# Patient Record
Sex: FEMALE | Race: BLACK OR AFRICAN AMERICAN | Employment: FULL TIME | URBAN - METROPOLITAN AREA
[De-identification: names, ages, dates, MRNs, and addresses within clinical notes are randomized per-mention and may not be internally consistent; named-entity substitution may affect disease eponyms.]

---

## 2020-04-12 ENCOUNTER — ED HISTORICAL/CONVERTED ENCOUNTER (OUTPATIENT)
Dept: OTHER | Age: 20
End: 2020-04-12

## 2020-04-17 ENCOUNTER — ED HISTORICAL/CONVERTED ENCOUNTER (OUTPATIENT)
Dept: OTHER | Age: 20
End: 2020-04-17

## 2020-04-19 ENCOUNTER — ED HISTORICAL/CONVERTED ENCOUNTER (OUTPATIENT)
Dept: OTHER | Age: 20
End: 2020-04-19

## 2020-04-21 ENCOUNTER — ED HISTORICAL/CONVERTED ENCOUNTER (OUTPATIENT)
Dept: OTHER | Age: 20
End: 2020-04-21

## 2020-06-27 ENCOUNTER — ED HISTORICAL/CONVERTED ENCOUNTER (OUTPATIENT)
Dept: OTHER | Age: 20
End: 2020-06-27

## 2022-07-26 ENCOUNTER — APPOINTMENT (OUTPATIENT)
Dept: GENERAL RADIOLOGY | Age: 22
End: 2022-07-26
Attending: EMERGENCY MEDICINE
Payer: COMMERCIAL

## 2022-07-26 ENCOUNTER — HOSPITAL ENCOUNTER (EMERGENCY)
Age: 22
Discharge: HOME | End: 2022-07-26
Attending: EMERGENCY MEDICINE
Payer: COMMERCIAL

## 2022-07-26 VITALS
OXYGEN SATURATION: 98 % | RESPIRATION RATE: 17 BRPM | WEIGHT: 187 LBS | BODY MASS INDEX: 28.34 KG/M2 | TEMPERATURE: 98.1 F | HEIGHT: 68 IN | HEART RATE: 74 BPM | DIASTOLIC BLOOD PRESSURE: 84 MMHG | SYSTOLIC BLOOD PRESSURE: 120 MMHG

## 2022-07-26 DIAGNOSIS — M25.531 RIGHT WRIST PAIN: Primary | ICD-10-CM

## 2022-07-26 PROCEDURE — 73110 X-RAY EXAM OF WRIST: CPT

## 2022-07-26 PROCEDURE — 74011250637 HC RX REV CODE- 250/637: Performed by: NURSE PRACTITIONER

## 2022-07-26 PROCEDURE — 99283 EMERGENCY DEPT VISIT LOW MDM: CPT

## 2022-07-26 RX ORDER — NAPROXEN 500 MG/1
500 TABLET ORAL
Status: COMPLETED | OUTPATIENT
Start: 2022-07-26 | End: 2022-07-26

## 2022-07-26 RX ORDER — NAPROXEN 500 MG/1
500 TABLET ORAL 2 TIMES DAILY WITH MEALS
Qty: 20 TABLET | Refills: 0 | Status: SHIPPED | OUTPATIENT
Start: 2022-07-26 | End: 2022-08-05

## 2022-07-26 RX ADMIN — NAPROXEN 500 MG: 500 TABLET ORAL at 13:56
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
## 2022-07-26 NOTE — Clinical Note
[facility] EMERGENCY DEPT  [address] [place] [address] [place] [address]  [phone]    Work/School Note    Date: 7/26/2022    To Whom It May concern:    Bora Kilgore was seen and treated today in the emergency room by the following provider(s):  Attending Provider: Juni Griffith MD  Nurse Practitioner: Mendel Verdugo NP. Bora Kilgore is excused from work/school on 07/26/22 and 07/27/22. She is medically clear to return to work/school on 7/28/2022.        Sincerely,          Margareth Chua NP
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
## 2022-08-07 ENCOUNTER — HOSPITAL ENCOUNTER (EMERGENCY)
Age: 22
Discharge: LEFT BEFORE DISCHARGE | End: 2022-08-07
Attending: EMERGENCY MEDICINE
Payer: COMMERCIAL

## 2022-08-07 VITALS
WEIGHT: 179 LBS | HEART RATE: 69 BPM | OXYGEN SATURATION: 99 % | BODY MASS INDEX: 27.13 KG/M2 | HEIGHT: 68 IN | RESPIRATION RATE: 16 BRPM | SYSTOLIC BLOOD PRESSURE: 116 MMHG | DIASTOLIC BLOOD PRESSURE: 73 MMHG | TEMPERATURE: 98.4 F

## 2022-08-07 PROCEDURE — 75810000275 HC EMERGENCY DEPT VISIT NO LEVEL OF CARE

## 2022-09-15 ENCOUNTER — HOSPITAL ENCOUNTER (EMERGENCY)
Age: 22
Discharge: HOME | End: 2022-09-15
Attending: STUDENT IN AN ORGANIZED HEALTH CARE EDUCATION/TRAINING PROGRAM | Admitting: STUDENT IN AN ORGANIZED HEALTH CARE EDUCATION/TRAINING PROGRAM
Payer: COMMERCIAL

## 2022-09-15 VITALS
DIASTOLIC BLOOD PRESSURE: 95 MMHG | BODY MASS INDEX: 28.34 KG/M2 | SYSTOLIC BLOOD PRESSURE: 130 MMHG | OXYGEN SATURATION: 99 % | TEMPERATURE: 98.3 F | HEIGHT: 68 IN | HEART RATE: 72 BPM | WEIGHT: 187 LBS | RESPIRATION RATE: 18 BRPM
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
## 2022-09-15 DIAGNOSIS — M25.561 ACUTE PAIN OF RIGHT KNEE: ICD-10-CM

## 2022-09-15 PROCEDURE — 99283 EMERGENCY DEPT VISIT LOW MDM: CPT

## 2022-09-15 RX ORDER — ACETAMINOPHEN 325 MG/1
650 TABLET ORAL
Qty: 20 TABLET | Refills: 0 | Status: SHIPPED | OUTPATIENT
Start: 2022-09-15

## 2022-09-15 RX ORDER — METHOCARBAMOL 750 MG/1
750 TABLET, FILM COATED ORAL
Qty: 20 TABLET | Refills: 0 | Status: SHIPPED | OUTPATIENT
Start: 2022-09-15

## 2022-09-15 NOTE — DISCHARGE INSTRUCTIONS
Thank you for presenting to our Emergency Department for your care. Below you will find a list of your labs and imaging from your visit today if applicable. Should you have any questions regarding these results please do not hesitate to call the emergency department. Please review [employer] for a more detailed result list since the below list may not be comprehensive. Instructions on how to sign up to [employer] should be provided in this packet. Labs -   No results found for this or any previous visit (from the past 12 hour(s)). Radiologic Studies -   No orders to display     CT Results  (Last 48 hours)      None          CXR Results  (Last 48 hours)      None          ____________________________________________________________________________    You decided to leave the Emergency Department Against Medical Advice. I have voiced my concerns that a full evaluation and/or treatment had not occurred. You still need continued evaluation to determine if your symptoms are caused by a condition that present risk of death,r morbidity, permanent disability, prolonged hospitalization, prolonged illness, among others. For this reason, we urge you to return or go to any emergency department to continue your care. Leaving against medical advice from our facility does not preclude you from coming back to continue your care, in fact, we encourage you to. Call 911 if you experience any worsening or new symptoms after leaving our Emergency Department. Please seek evaluation promptly. The exam and treatment you received in the Emergency Department were for an urgent problem and are not intended as complete care. It is important that you follow-up with a doctor, nurse practitioner, or physician assistant to:  (1) confirm your diagnosis,  (2) re-evaluation of changes in your illness and treatment, and  (3) for ongoing care.   If your symptoms become worse or you do not improve as expected and you are unable to reach your usual health care provider, you should return to the Emergency Department. We are available 24 hours a day. If you change your mind and decide to come back to the Emergency Department to continue your care you are more than welcome to come back here. We encourage that. Please take your discharge instructions with you when you go to your follow-up appointment or if you go to another Emergency Department to continue your care. If a prescription has been provided, please have it filled as soon as possible to prevent a delay in treatment. Read the entire medication instruction sheet provided to you by the pharmacy. If you have any questions or reservations about taking the medication due to side effects or interactions with other medications, please call your primary care physician or contact the ER to speak with the charge nurse.

## 2022-09-15 NOTE — ED PROVIDER NOTES
[facility]  EMERGENCY DEPARTMENT ENCOUNTER NOTE    Date: 9/15/2022  Patient Name: Yudy Motley    History of Presenting Illness     Chief Complaint   Patient presents with    Fall     HPI: Yudy Motley, 24 y.o. female with no known past medical history or medications presents for knee pain. - Onset prior to presentation. She had a mechanical fall hitting her knee directly. She has tenderness over the medial aspect of the knee. She has an antalgic gait however the patient denies any numbness or weakness in the foot. No hip pain. She did not have any head trauma, loss consciousness. Denies any alcohol or drugs. On arrival to the room, the patient was on the phone, crying, and saying that she wants to leave the emergency department. She does not want to go for x-ray and does not want any testing. She said that she is very adamant about leaving now and does not want to wait even for 20 minutes to get the x-ray with the results back. Status importance of getting an x-ray as I am unable to rule out a fracture but the patient is insistent on leaving. I did explain to her that a knee fracture may cause complications if undiagnosed and might cause early arthritis and many other complications. She understands but reports that she will come back to the emergency department when she feels better. She was asked if there is anything else going on with her but she denies any other issues and still requests to leave.   Does not appear to be intoxicated and has capacity to make her own decisions.    - Gait: antalgic, able to bear weight.  - History of fractures or surgeries: no  - AC use: no  - Overlying lacerations: no  - Other injuries: no    Medical History   I reviewed the medical, surgical, family, and social history, as well as allergies:    PCP: Yaquelin Finch MD    Past Medical History:  Past Medical History:   Diagnosis Date    Right wrist injury      Past Surgical History:  No past surgical history on file. Current Outpatient Medications:  Current Outpatient Medications   Medication Instructions    acetaminophen (TYLENOL) 650 mg, Oral, EVERY 6 HOURS AS NEEDED    methocarbamoL (ROBAXIN-750) 750 mg, Oral, 3 TIMES DAILY AS NEEDED      Family History:  No family history on file. Social History:  Social History     Tobacco Use    Smoking status: Never    Smokeless tobacco: Never   Substance Use Topics    Alcohol use: Never    Drug use: Never     Allergies:  No Known Allergies    Review of Systems     Positives and pertinent negatives as per HPI. All other systems were reviewed and are negative. Physical Exam and Vital Signs   Vital Signs - Reviewed the patient's vital signs. Patient Vitals for the past 12 hrs:   Temp Pulse Resp BP SpO2   09/15/22 1828 98.3 °F (36.8 °C) 74 20 (!) 139/107 99 %     Physical Exam:    GENERAL: not in apparent distress  HEENT:  * EOMI  * Head atraumatic  CV:  * warm extremities  * no cyanosis  PULMONARY: no respiratory distress, non labored breathing, no audible wheezing or stridor, no accessory muscle use  ABDOMEN: soft, moving in bed and pulls to seated position without grimace or pain  JOINT: R Knee  * No swelling, no erythema. * No abrasions, no lacerations  * No warmth to palpation  * Noted medial tenderness to palpation  * No passive ROM limitation  * No active ROM limitation  * No instability or laxity on varus or valgus stress  * No abmormlaities on neurovascular exam including DP, PT pulses and sensation over lower extremity dermatomes. * Gait is antalgic, patient able to bear weight  SKIN: no rashes or signs of trauma  NEURO:  * Speech clear  * GCS 15    Medical Decision Making   - I am the first and primary provider for this patient and am the primary provider of record. - I reviewed the vital signs, available nursing notes, past medical history, past surgical history, family history and social history.   - Initial assessment performed. The patients presenting problems have been discussed, and the staff are in agreement with the care plan formulated and outlined with them. I have encouraged them to ask questions as they arise throughout their visit. - Available medical records, nursing notes, old EKGs, and EMS run sheets (if patient was EMS transported) were reviewed    MDM:   Patient is a 24 y.o. female presenting for knee pain after mechanical fall. Vitals reveal no significant abnormalities and physical exam reveals  medial knee tendernes  with no gross knee instability or neurovascular compromise. Based on the history, physical exam, risk factors, and vital signs, differential includes: sprain, strain, ligamentous injury, fracture, dislocation, soft tissue contusion. Nerve and vascular damage is unlikely as the patient has a completely benign neurovascular exam.  Although presentation is consistent with a sprain, I cannot rule out fracture. X-ray was offered, the patient is refusing. Results     Labs:  No results found for this or any previous visit (from the past 12 hour(s)). Radiologic Studies:  CT Results  (Last 48 hours)      None          CXR Results  (Last 48 hours)      None          Medications ordered:  Medications - No data to display  ED Course and Reassessment     ED Course:          Reassessment:    The patient has chosen to leave the hospital against our medical advice due to not wanting to wait. The patient is clinically not intoxicated, appears to have intact insight, judgment and reason and in my medical opinion has the capacity to make decisions. It would be battery to subject a patient to treatment against their will. I have voiced my concerns for the patient's health given that a full evaluation and/or treatment had not occurred. I have discussed the need for continued evaluation to determine if their symptoms are caused by a condition that present risk of morbidity.  Risks including but not limited to permanent disability, prolonged hospitalization, prolonged illness, were discussed. I tried offering alternative options in hopes that the patient might be amenable to partial evaluation and treatment which would be medically beneficial to the patient, though the patient declined my options and insisted on leaving. Because I have been unable to convince the patient to stay, I answered any questions they had about their condition and asked them to return to this or any other ED as soon as possible to complete their evaluation. I emphasized that leaving against medical advice does not preclude returning here for further evaluation. I urged the patient to return if they change their mind about the further evaluation and treatment. Final Disposition     DISCHARGED FROM EMERGENCY DEPARTMENT    Patient will be discharged from the Emergency Department in stable condition. All of the diagnostic tests were reviewed and any questions were answered. Diagnosis, results, follow up if applicable, and return precautions were discussed. I have also put together printed discharge instructions for them that include: 1) educational information regarding their diagnosis, 2) how to care for their diagnosis at home, as well a 3) list of reasons why they would want to return to the ED prior to their follow-up appointment, should their condition change. Any labs or imaging done in the ED will be either printed with the discharge paperwork or available through [address] DISCHARGE PLAN:  1. Current Discharge Medication List        START taking these medications    Details   acetaminophen (TYLENOL) 325 mg tablet Take 2 Tablets by mouth every six (6) hours as needed for Pain. Qty: 20 Tablet, Refills: 0      methocarbamoL (Robaxin-750) 750 mg tablet Take 1 Tablet by mouth three (3) times daily as needed for Muscle Spasm(s). Qty: 20 Tablet, Refills: 0           2.    Follow-up Information       Follow up With Specialties Details Why Contact Info    [facility] EMERGENCY DEPT Emergency Medicine Go today for XR of knee [address]  [phone]          3. Return to ED if worse  4. Current Discharge Medication List        START taking these medications    Details   acetaminophen (TYLENOL) 325 mg tablet Take 2 Tablets by mouth every six (6) hours as needed for Pain. Qty: 20 Tablet, Refills: 0  Start date: 9/15/2022      methocarbamoL (Robaxin-750) 750 mg tablet Take 1 Tablet by mouth three (3) times daily as needed for Muscle Spasm(s). Qty: 20 Tablet, Refills: 0  Start date: 9/15/2022             Diagnosis     Clinical Impression:   1. Fall, initial encounter    2. Acute pain of right knee      Attestations:    Janiya Richardson MD    Please note that this dictation was completed with [employer], the computer voice recognition software. Quite often unanticipated grammatical, syntax, homophones, and other interpretive errors are inadvertently transcribed by the computer software. Please disregard these errors. Please excuse any errors that have escaped final proofreading. Thank you.

## 2023-03-28 ENCOUNTER — HOSPITAL ENCOUNTER (EMERGENCY)
Age: 23
Discharge: HOME | End: 2023-03-28
Attending: EMERGENCY MEDICINE
Payer: COMMERCIAL

## 2023-03-28 ENCOUNTER — APPOINTMENT (OUTPATIENT)
Dept: ULTRASOUND IMAGING | Age: 23
End: 2023-03-28
Attending: EMERGENCY MEDICINE
Payer: COMMERCIAL

## 2023-03-28 VITALS
OXYGEN SATURATION: 99 % | BODY MASS INDEX: 27.58 KG/M2 | DIASTOLIC BLOOD PRESSURE: 77 MMHG | TEMPERATURE: 98.2 F | HEART RATE: 70 BPM | RESPIRATION RATE: 18 BRPM | WEIGHT: 182 LBS | HEIGHT: 68 IN | SYSTOLIC BLOOD PRESSURE: 122 MMHG

## 2023-03-28 DIAGNOSIS — O21.9 NAUSEA AND VOMITING IN PREGNANCY PRIOR TO 22 WEEKS GESTATION: Primary | ICD-10-CM

## 2023-03-28 LAB
ABO + RH BLD: NORMAL
ALBUMIN SERPL-MCNC: 4 G/DL (ref 3.5–5)
ALBUMIN/GLOB SERPL: 1 (ref 1.1–2.2)
ALP SERPL-CCNC: 34 U/L (ref 45–117)
ALT SERPL-CCNC: 37 U/L (ref 12–78)
ANION GAP SERPL CALC-SCNC: 7 MMOL/L (ref 5–15)
AST SERPL W P-5'-P-CCNC: 28 U/L (ref 15–37)
BASOPHILS # BLD: 0 K/UL (ref 0–0.1)
BASOPHILS NFR BLD: 1 % (ref 0–1)
BILIRUB SERPL-MCNC: 0.8 MG/DL (ref 0.2–1)
BUN SERPL-MCNC: 11 MG/DL (ref 6–20)
BUN/CREAT SERPL: 21 (ref 12–20)
CA-I BLD-MCNC: 9.6 MG/DL (ref 8.5–10.1)
CHLORIDE SERPL-SCNC: 104 MMOL/L (ref 97–108)
CO2 SERPL-SCNC: 24 MMOL/L (ref 21–32)
CREAT SERPL-MCNC: 0.52 MG/DL (ref 0.55–1.02)
DIFFERENTIAL METHOD BLD: NORMAL
EOSINOPHIL # BLD: 0.1 K/UL (ref 0–0.4)
EOSINOPHIL NFR BLD: 1 % (ref 0–7)
ERYTHROCYTE [DISTWIDTH] IN BLOOD BY AUTOMATED COUNT: 12.7 % (ref 11.5–14.5)
GLOBULIN SER CALC-MCNC: 4.2 G/DL (ref 2–4)
GLUCOSE SERPL-MCNC: 85 MG/DL (ref 65–100)
HCG SERPL-ACNC: ABNORMAL MIU/ML (ref 0–6)
HCT VFR BLD AUTO: 38.7 % (ref 35–47)
HGB BLD-MCNC: 12.5 G/DL (ref 11.5–16)
IMM GRANULOCYTES # BLD AUTO: 0 K/UL (ref 0–0.04)
IMM GRANULOCYTES NFR BLD AUTO: 0 % (ref 0–0.5)
LYMPHOCYTES # BLD: 3.3 K/UL (ref 0.8–3.5)
LYMPHOCYTES NFR BLD: 41 % (ref 12–49)
MAGNESIUM SERPL-MCNC: 2 MG/DL (ref 1.6–2.4)
MCH RBC QN AUTO: 27.7 PG (ref 26–34)
MCHC RBC AUTO-ENTMCNC: 32.3 G/DL (ref 30–36.5)
MCV RBC AUTO: 85.8 FL (ref 80–99)
MONOCYTES # BLD: 0.6 K/UL (ref 0–1)
MONOCYTES NFR BLD: 8 % (ref 5–13)
NEUTS SEG # BLD: 3.9 K/UL (ref 1.8–8)
NEUTS SEG NFR BLD: 49 % (ref 32–75)
NRBC # BLD: 0 K/UL (ref 0–0.01)
NRBC BLD-RTO: 0 PER 100 WBC
PLATELET # BLD AUTO: 232 K/UL (ref 150–400)
PMV BLD AUTO: 10.9 FL (ref 8.9–12.9)
POTASSIUM SERPL-SCNC: 4.1 MMOL/L (ref 3.5–5.1)
PROT SERPL-MCNC: 8.2 G/DL (ref 6.4–8.2)
RBC # BLD AUTO: 4.51 M/UL (ref 3.8–5.2)
SODIUM SERPL-SCNC: 135 MMOL/L (ref 136–145)
WBC # BLD AUTO: 7.9 K/UL (ref 3.6–11)

## 2023-03-28 PROCEDURE — 83735 ASSAY OF MAGNESIUM: CPT

## 2023-03-28 PROCEDURE — 99284 EMERGENCY DEPT VISIT MOD MDM: CPT

## 2023-03-28 PROCEDURE — 86900 BLOOD TYPING SEROLOGIC ABO: CPT

## 2023-03-28 PROCEDURE — 85025 COMPLETE CBC W/AUTO DIFF WBC: CPT

## 2023-03-28 PROCEDURE — 84702 CHORIONIC GONADOTROPIN TEST: CPT

## 2023-03-28 PROCEDURE — 76817 TRANSVAGINAL US OBSTETRIC: CPT

## 2023-03-28 PROCEDURE — 80053 COMPREHEN METABOLIC PANEL: CPT

## 2023-03-28 PROCEDURE — 36415 COLL VENOUS BLD VENIPUNCTURE: CPT

## 2023-03-28 RX ORDER — ONDANSETRON 4 MG/1
4 TABLET, ORALLY DISINTEGRATING ORAL
Qty: 20 TABLET | Refills: 0 | Status: SHIPPED | OUTPATIENT
Start: 2023-03-28

## 2023-03-28 RX ORDER — ONDANSETRON 2 MG/ML
4 INJECTION INTRAMUSCULAR; INTRAVENOUS ONCE
Status: DISCONTINUED | OUTPATIENT
Start: 2023-03-28 | End: 2023-03-28 | Stop reason: HOSPADM

## 2023-03-28 NOTE — ED PROVIDER NOTES
EMERGENCY DEPARTMENT HISTORY AND PHYSICAL EXAM      Date: 3/28/2023  Patient Name: Liana Swenson    History of Presenting Illness     Chief Complaint   Patient presents with    Vomiting       History Provided By: Patient    HPI: Liana Swenson, 20 y.o. female with a past medical history significant No significant past medical history presents to the ED with chief complaint of Vomiting  . 65-year-old female newly found out that she is pregnant feels that she may be very early approximately 4 to 6 weeks along. Nausea vomiting. No abdominal pain no vaginal bleeding no discharge no dysuria. No fevers. Had been prescribed prenatal vitamins as well as Reglan which have not been helping. Did not like the taste of Zofran. There are no other complaints, changes, or physical findings at this time. PCP: Gaudencio Hooker MD    Current Facility-Administered Medications   Medication Dose Route Frequency Provider Last Rate Last Admin    sodium chloride 0.9 % bolus infusion 1,000 mL  1,000 mL IntraVENous ONCE Yoly ELDRIDGE MD        ondansetron [facility] injection 4 mg  4 mg IntraVENous ONCE Marga Galdamez MD         Current Outpatient Medications   Medication Sig Dispense Refill    ondansetron (ZOFRAN ODT) 4 mg disintegrating tablet Take 1 Tablet by mouth every eight (8) hours as needed for Nausea or Vomiting. 20 Tablet 0    acetaminophen (TYLENOL) 325 mg tablet Take 2 Tablets by mouth every six (6) hours as needed for Pain. 20 Tablet 0    methocarbamoL (Robaxin-750) 750 mg tablet Take 1 Tablet by mouth three (3) times daily as needed for Muscle Spasm(s). 20 Tablet 0       Past History     Past Medical History:  Past Medical History:   Diagnosis Date    Right wrist injury        Past Surgical History:  No past surgical history on file. Family History:  No family history on file.     Social History:  Social History     Tobacco Use    Smoking status: Never    Smokeless tobacco: Never Substance Use Topics    Alcohol use: Never    Drug use: Never       Allergies:  No Known Allergies      Review of Systems   Review of Systems   Constitutional: Negative. Negative for chills, fatigue and fever. HENT: Negative. Negative for congestion, nosebleeds and sore throat. Eyes: Negative. Negative for pain, discharge and visual disturbance. Respiratory: Negative. Negative for cough, chest tightness and shortness of breath. Cardiovascular:  Negative for chest pain, palpitations and leg swelling. Gastrointestinal:  Positive for nausea. Negative for abdominal pain, blood in stool, constipation, diarrhea and vomiting. Endocrine: Negative. Genitourinary: Negative. Negative for difficulty urinating, dysuria, pelvic pain and vaginal bleeding. Musculoskeletal: Negative. Negative for arthralgias, back pain and myalgias. Skin: Negative. Negative for rash and wound. Allergic/Immunologic: Negative. Neurological: Negative. Negative for dizziness, syncope, weakness, numbness and headaches. Hematological: Negative. Psychiatric/Behavioral: Negative. Negative for agitation, confusion and suicidal ideas. All other systems reviewed and are negative. Positives and Pertinent negatives as per HPI. Physical Exam   Patient Vitals for the past 24 hrs:   Temp Pulse Resp BP SpO2   03/28/23 0944 98.2 °F (36.8 °C) 70 18 122/77 99 %         Physical Exam  Vitals and nursing note reviewed. Exam conducted with a chaperone present. Constitutional:       Appearance: Normal appearance. She is normal weight. HENT:      Head: Normocephalic and atraumatic. Nose: Nose normal.      Mouth/Throat:      Mouth: Mucous membranes are moist.      Pharynx: Oropharynx is clear. Eyes:      Extraocular Movements: Extraocular movements intact. Conjunctiva/sclera: Conjunctivae normal.      Pupils: Pupils are equal, round, and reactive to light.    Cardiovascular:      Rate and Rhythm: Normal rate and regular rhythm. Pulses: Normal pulses. Heart sounds: Normal heart sounds. Pulmonary:      Effort: Pulmonary effort is normal. No respiratory distress. Breath sounds: Normal breath sounds. Abdominal:      General: Abdomen is flat. Bowel sounds are normal. There is no distension. Palpations: Abdomen is soft. Tenderness: There is no abdominal tenderness. There is no guarding. Musculoskeletal:         General: No swelling, tenderness, deformity or signs of injury. Normal range of motion. Cervical back: Normal range of motion and neck supple. Right lower leg: No edema. Left lower leg: No edema. Skin:     General: Skin is warm and dry. Capillary Refill: Capillary refill takes less than 2 seconds. Findings: No lesion or rash. Neurological:      General: No focal deficit present. Mental Status: She is alert and oriented to person, place, and time. Mental status is at baseline. Cranial Nerves: No cranial nerve deficit. Psychiatric:         Mood and Affect: Mood normal.         Behavior: Behavior normal.         Thought Content: Thought content normal.         Judgment: Judgment normal.       Diagnostic Study Results     LABS:   Recent Results (from the past 12 hour(s))   CBC WITH AUTOMATED DIFF    Collection Time: 03/28/23 11:24 AM   Result Value Ref Range    WBC 7.9 3.6 - 11.0 K/uL    RBC 4.51 3.80 - 5.20 M/uL    HGB 12.5 11.5 - 16.0 g/dL    HCT 38.7 35.0 - 47.0 %    MCV 85.8 80.0 - 99.0 FL    MCH 27.7 26.0 - 34.0 PG    MCHC 32.3 30.0 - 36.5 g/dL    RDW 12.7 11.5 - 14.5 %    PLATELET 962 558 - 559 K/uL    MPV 10.9 8.9 - 12.9 FL    NRBC 0.0 0.0  WBC    ABSOLUTE NRBC 0.00 0.00 - 0.01 K/uL    NEUTROPHILS 49 32 - 75 %    LYMPHOCYTES 41 12 - 49 %    MONOCYTES 8 5 - 13 %    EOSINOPHILS 1 0 - 7 %    BASOPHILS 1 0 - 1 %    IMMATURE GRANULOCYTES 0 0 - 0.5 %    ABS. NEUTROPHILS 3.9 1.8 - 8.0 K/UL    ABS. LYMPHOCYTES 3.3 0.8 - 3.5 K/UL    ABS.  MONOCYTES 0.6 0.0 - 1.0 K/UL    ABS. EOSINOPHILS 0.1 0.0 - 0.4 K/UL    ABS. BASOPHILS 0.0 0.0 - 0.1 K/UL    ABS. IMM. GRANS. 0.0 0.00 - 0.04 K/UL    DF AUTOMATED     BLOOD TYPE, (ABO+RH)    Collection Time: 03/28/23 11:24 AM   Result Value Ref Range    ABO/Rh(D) O Positive         EKG: EKG interpreted independently by ER physician. RADIOLOGY:  Non-plain film images such as CT, Ultrasound and MRI are read by the radiologist. Plain radiographic images are visualized and preliminarily interpreted by the ED Provider with the below findings:          Interpretation per the Radiologist below, if available at the time of this note:     US UTS TRANSVAGINAL OB    Result Date: 3/28/2023  Clinical history: Pregnant, nausea, vomiting Realtime sonographic imaging of the pelvis was performed transvaginally. The examination demonstrates a single viable intrauterine pregnancy with a crown-rump length of 10.27 cm and estimated gestational age of 9 weeks 1 day. The gestational sac is normal in size and appearance. The placenta cannot be evaluated given the early age of the pregnancy. Fetal heart rate is 138 beats per minute. The left ovary measures 3.1 x 2.2 x 1.9 cm and the right ovary measures 2.1 x 1.8 x 1.5 cm. They are normal in appearance. Blood flow is documented within both ovaries. Trace free fluid is noted. Single viable intrauterine pregnancy with estimated gestational age of 12 weeks 1 day. Normal ovaries. CT Results  (Last 48 hours)      None          CXR Results  (Last 48 hours)      None            Medical Decision Making and ED Course       CC/HPI Summary, DDx, ED Course, and Reassessment: 50-year-old female newly pregnant with nausea vomiting. Differential includes hyperemesis versus dehydration versus blighted ovum. Plan for lab work including CBC CMP urine ultrasound IV fluids and antiemetics. Patient notes that she feels much better wants finding out how far along she is with her pregnancy.   Asking for something p.o. Will give antiemetics p.o. challenge and reassess patient does not want to stay for test results. These orders were placed during the ER evaluation:  Orders Placed This Encounter    [facility] OB     Standing Status:   Standing     Number of Occurrences:   1     Order Specific Question:   Transport     Answer:   BED [2]     Order Specific Question:   Reason for Exam     Answer:   pregnancy nv     Order Specific Question:   Is Patient Pregnant? Answer:   Yes    CBC WITH AUTOMATED DIFF     Standing Status:   Standing     Number of Occurrences:   1    COMPREHENSIVE METABOLIC PANEL     Standing Status:   Standing     Number of Occurrences:   1    MAGNESIUM     Standing Status:   Standing     Number of Occurrences:   1    URINALYSIS W/ RFLX MICROSCOPIC     Standing Status:   Standing     Number of Occurrences:   1    HCG QT     Standing Status:   Standing     Number of Occurrences:   1    VITAL SIGNS     Standing Status:   Standing     Number of Occurrences:   1    TYPE, ABO & RH     Standing Status:   Standing     Number of Occurrences:   1    SALINE LOCK IV ONE TIME STAT     Standing Status:   Standing     Number of Occurrences:   1    sodium chloride 0.9 % bolus infusion 1,000 mL    ondansetron (ZOFRAN) injection 4 mg    ondansetron (ZOFRAN ODT) 4 mg disintegrating tablet     Sig: Take 1 Tablet by mouth every eight (8) hours as needed for Nausea or Vomiting. Dispense:  20 Tablet     Refill:  0        Patient was given the following medications:  Medications   sodium chloride 0.9 % bolus infusion 1,000 mL (has no administration in time range)   ondansetron (ZOFRAN) injection 4 mg (has no administration in time range)           ED Course:              Vital Signs-Reviewed the patient's vital signs.   Patient Vitals for the past 24 hrs:   Temp Pulse Resp BP SpO2   03/28/23 0944 98.2 °F (36.8 °C) 70 18 122/77 99 %     Vitals interpreted independently by ER physician. stable    Medical decision making tools:                No data recorded          Disposition Considerations (Tests not done, Shared Decision Making, Pt Expectation of Test or Tx.): Patient is able to tolerate p.o. prior to discharge. She is at 17 weeks pregnant. Patient provided with OB for follow-up. CONSULTS: (Who and What was discussed)  None    Chronic Conditions: no    Social Determinants affecting Dx or Tx: None    Records Reviewed (source and summary of external notes): None  Records Reviewed: Previous Hospital chart. EMS run report. I reviewed the vital signs, available nursing notes, past medical history, past surgical history, family history and social history. Initial assessment performed. The patients presenting problems have been discussed, and they are in agreement with the care plan formulated and outlined with them. I have encouraged them to ask questions as they arise throughout their visit. Discharged      Procedures               Disposition       Emergency Department Disposition:  Discharged      Diagnosis     Clinical Impression:   1. Nausea and vomiting in pregnancy prior to 22 weeks gestation        Attestations:    Yenni Medley MD    Please note that this dictation was completed with ethology, the computer voice recognition software. Quite often unanticipated grammatical, syntax, homophones, and other interpretive errors are inadvertently transcribed by the computer software. Please disregard these errors. Please excuse any errors that have escaped final proofreading. Thank you.

## 2023-03-28 NOTE — Clinical Note
[facility] EMERGENCY DEPT  [address] [place] [id number]  [phone]    Work/School Note    Date: 3/28/2023    To Whom It May concern:      Natalya Hidalgo was seen and treated today in the emergency room by the following provider(s):  Attending Provider: Anisa Ricks MD.      Natalya Hidalgo is excused from work/school on 03/28/23. She is clear to return to work/school on 03/29/23.         Sincerely,          Ariadne Oh MD

## 2023-03-28 NOTE — ED TRIAGE NOTES
Reports she has been vomiting x1-2 weeks ago and feels she can't keep anything down. Reports she learned x2 weeks ago that she is pregnant - unknown how far along.  A1

## 2023-03-28 NOTE — DISCHARGE INSTRUCTIONS
Thank you! Thank you for allowing me to care for you in the emergency department. It is my goal to provide you with excellent care. If you have not received excellent quality care, please ask to speak to the nurse manager. Please fill out the survey that will come to you by mail or email since we listen to your feedback! Below you will find a list of your tests from today's visit. Should you have any questions, please do not hesitate to call the emergency department. Labs  No results found for this or any previous visit (from the past 12 hour(s)). Radiologic Studies  US UTS TRANSVAGINAL OB   Final Result   Single viable intrauterine pregnancy with estimated gestational age   of 12 weeks 1 day. Normal ovaries. CT Results  (Last 48 hours)      None          CXR Results  (Last 48 hours)      None          ------------------------------------------------------------------------------------------------------------  The exam and treatment you received in the Emergency Department were for an urgent problem and are not intended as complete care. It is important that you follow-up with a doctor, nurse practitioner, or physician assistant to:  (1) confirm your diagnosis,  (2) re-evaluation of changes in your illness and treatment, and  (3) for ongoing care. Please take your discharge instructions with you when you go to your follow-up appointment. If you have any problem arranging a follow-up appointment, contact the Emergency Department. If your symptoms become worse or you do not improve as expected and you are unable to reach your health care provider, please return to the Emergency Department. We are available 24 hours a day. If a prescription has been provided, please have it filled as soon as possible to prevent a delay in treatment.  If you have any questions or reservations about taking the medication due to side effects or interactions with other medications, please call your primary care provider or contact the ER.

## 2023-05-23 RX ORDER — ACETAMINOPHEN 325 MG/1
650 TABLET ORAL EVERY 6 HOURS PRN
Status: ON HOLD | COMMUNITY
Start: 2022-09-15 | End: 2023-05-30

## 2023-05-23 RX ORDER — METHOCARBAMOL 750 MG/1
750 TABLET, FILM COATED ORAL 3 TIMES DAILY PRN
Status: ON HOLD | COMMUNITY
Start: 2022-09-15 | End: 2023-05-30

## 2023-05-23 RX ORDER — ONDANSETRON 4 MG/1
4 TABLET, ORALLY DISINTEGRATING ORAL EVERY 8 HOURS PRN
Status: ON HOLD | COMMUNITY
Start: 2023-03-28 | End: 2023-05-30

## 2023-05-30 ENCOUNTER — APPOINTMENT (OUTPATIENT)
Facility: HOSPITAL | Age: 23
End: 2023-05-30
Payer: MEDICAID

## 2023-05-30 ENCOUNTER — HOSPITAL ENCOUNTER (OUTPATIENT)
Facility: HOSPITAL | Age: 23
Discharge: HOME | End: 2023-05-30
Attending: OBSTETRICS & GYNECOLOGY | Admitting: OBSTETRICS & GYNECOLOGY
Payer: MEDICAID

## 2023-05-30 VITALS
DIASTOLIC BLOOD PRESSURE: 77 MMHG | RESPIRATION RATE: 16 BRPM | OXYGEN SATURATION: 100 % | HEART RATE: 65 BPM | SYSTOLIC BLOOD PRESSURE: 127 MMHG | TEMPERATURE: 98.6 F

## 2023-05-30 PROBLEM — R10.9 ABDOMINAL PAIN: Status: ACTIVE | Noted: 2023-05-30

## 2023-05-30 LAB
ABO + RH BLD: NORMAL
AMPHET UR QL SCN: NEGATIVE
APPEARANCE UR: CLEAR
BACTERIA URNS QL MICRO: ABNORMAL /HPF
BARBITURATES UR QL SCN: NEGATIVE
BENZODIAZ UR QL: NEGATIVE
BILIRUB UR QL: NEGATIVE
BLOOD GROUP ANTIBODIES SERPL: NEGATIVE
CANNABINOIDS UR QL SCN: POSITIVE
COCAINE UR QL SCN: NEGATIVE
COLOR UR: ABNORMAL
EPITH CASTS URNS QL MICRO: ABNORMAL /LPF
ERYTHROCYTE [DISTWIDTH] IN BLOOD BY AUTOMATED COUNT: 12.7 % (ref 11.5–14.5)
GLUCOSE UR STRIP.AUTO-MCNC: NEGATIVE MG/DL
HBV CORE IGM SER QL: NONREACTIVE
HCT VFR BLD AUTO: 30.8 % (ref 35–47)
HCV AB SER IA-ACNC: 0.13 INDEX
HCV AB SERPL QL IA: NONREACTIVE
HGB BLD-MCNC: 9.9 G/DL (ref 11.5–16)
HGB UR QL STRIP: NEGATIVE
HIV1 P24 AG SERPL QL IA: NONREACTIVE
HIV1+2 AB SERPL QL IA: NONREACTIVE
KETONES UR QL STRIP.AUTO: NEGATIVE MG/DL
LEUKOCYTE ESTERASE UR QL STRIP.AUTO: ABNORMAL
Lab: ABNORMAL
MCH RBC QN AUTO: 27.4 PG (ref 26–34)
MCHC RBC AUTO-ENTMCNC: 32.1 G/DL (ref 30–36.5)
MCV RBC AUTO: 85.3 FL (ref 80–99)
METHADONE UR QL: NEGATIVE
MUCOUS THREADS URNS QL MICRO: ABNORMAL /LPF
NITRITE UR QL STRIP.AUTO: NEGATIVE
NRBC # BLD: 0 K/UL (ref 0–0.01)
NRBC BLD-RTO: 0 PER 100 WBC
OPIATES UR QL: NEGATIVE
PCP UR QL: NEGATIVE
PH UR STRIP: 6 (ref 5–8)
PLATELET # BLD AUTO: 191 K/UL (ref 150–400)
PMV BLD AUTO: 10.7 FL (ref 8.9–12.9)
PROT UR STRIP-MCNC: NEGATIVE MG/DL
RBC # BLD AUTO: 3.61 M/UL (ref 3.8–5.2)
RBC #/AREA URNS HPF: ABNORMAL /HPF (ref 0–5)
SP GR UR REFRACTOMETRY: 1.01 (ref 1–1.03)
SPECIMEN EXP DATE BLD: NORMAL
URINE CULTURE IF INDICATED: ABNORMAL
UROBILINOGEN UR QL STRIP.AUTO: 0.1 EU/DL (ref 0.1–1)
WBC # BLD AUTO: 7.1 K/UL (ref 3.6–11)
WBC URNS QL MICRO: ABNORMAL /HPF (ref 0–4)

## 2023-05-30 PROCEDURE — 36415 COLL VENOUS BLD VENIPUNCTURE: CPT

## 2023-05-30 PROCEDURE — 86709 HEPATITIS A IGM ANTIBODY: CPT

## 2023-05-30 PROCEDURE — 76815 OB US LIMITED FETUS(S): CPT

## 2023-05-30 PROCEDURE — 85027 COMPLETE CBC AUTOMATED: CPT

## 2023-05-30 PROCEDURE — 99285 EMERGENCY DEPT VISIT HI MDM: CPT

## 2023-05-30 PROCEDURE — 80307 DRUG TEST PRSMV CHEM ANLYZR: CPT

## 2023-05-30 PROCEDURE — 99202 OFFICE O/P NEW SF 15 MIN: CPT | Performed by: OBSTETRICS & GYNECOLOGY

## 2023-05-30 PROCEDURE — 86762 RUBELLA ANTIBODY: CPT

## 2023-05-30 PROCEDURE — 86850 RBC ANTIBODY SCREEN: CPT

## 2023-05-30 PROCEDURE — 86803 HEPATITIS C AB TEST: CPT

## 2023-05-30 PROCEDURE — 87389 HIV-1 AG W/HIV-1&-2 AB AG IA: CPT

## 2023-05-30 PROCEDURE — 81001 URINALYSIS AUTO W/SCOPE: CPT

## 2023-05-30 PROCEDURE — 86901 BLOOD TYPING SEROLOGIC RH(D): CPT

## 2023-05-30 PROCEDURE — 86900 BLOOD TYPING SEROLOGIC ABO: CPT

## 2023-05-30 PROCEDURE — 86592 SYPHILIS TEST NON-TREP QUAL: CPT

## 2023-05-30 RX ORDER — NITROFURANTOIN 25; 75 MG/1; MG/1
100 CAPSULE ORAL 2 TIMES DAILY
Qty: 14 CAPSULE | Refills: 0 | Status: SHIPPED | OUTPATIENT
Start: 2023-05-30 | End: 2023-06-06

## 2023-05-30 NOTE — H&P
Department of Obstetrics and Gynecology  Attending Obstetrics History and Physical        CHIEF COMPLAINT:  abdominal pain    HISTORY OF PRESENT ILLNESS:      The patient is a 20 y.o.  1 parity 0 at 12 1/7 weeks. Patient presents with a chief complaint as above and is being admitted for abdominal pain. Pt has had no prenatal care. Last ultrasound done 10 weeks ago with discrepancy between images and final reading. DATES:    Last Menstrual Period:  2023  Estimated Due Date:  2023    PRENATAL CARE:    No prenatal care up to this point      PAST OB HISTORY: None, pt is G1          OB History    Para Term  AB Living   1             SAB IAB Ectopic Molar Multiple Live Births                    # Outcome Date GA Lbr Daniel/2nd Weight Sex Delivery Anes PTL Lv   1 Current              Past Gynecological History:      Menarche:    Last menstrual period:  No LMP recorded. Patient is pregnant. History of uterine fibroids:    History of endometriosis:    Pap History:    Sexually transmitted disease history:     Past Medical History:  None     Past Surgical History:  None    Social History:  None    Family History:  None    Medications Prior to Admission:  Medications Prior to Admission: acetaminophen (TYLENOL) 325 MG tablet, Take 2 tablets by mouth every 6 hours as needed  methocarbamol (ROBAXIN) 750 MG tablet, Take 1 tablet by mouth 3 times daily as needed  ondansetron (ZOFRAN-ODT) 4 MG disintegrating tablet, Take 1 tablet by mouth every 8 hours as needed  Allergies:  Patient has no known allergies. REVIEW OF SYSTEMS:    Patient has no history of depression. Patient has no symptoms of depression  Non-contributory    PHYSICAL EXAM:    General appearance:  awake, alert, cooperative, no apparent distress, and appears stated age  Neurologic:  Awake, alert, oriented to name, place and time. Cranial nerves II-XII are grossly intact. Motor is 5 out of 5 bilaterally.   Cerebellar finger to

## 2023-05-30 NOTE — PROGRESS NOTES
Pt arrives via ems pt does not know gestation. EMS does not know gestation. Pt has ultrasound in the chart used to estimate gestation. Orders received. 0920-D/C orders received pt can be discharged once official u/s read is in.    1040-PT states she feels fine, came in to find out sex of baby.  PT given d/c instructions gone over with pt pt verbalizes understanding and leaves unit ambulatory

## 2023-05-31 ENCOUNTER — TELEPHONE (OUTPATIENT)
Age: 23
End: 2023-05-31

## 2023-05-31 NOTE — TELEPHONE ENCOUNTER
Patient left a voicemail on 05/31 @12:40 PM. Needs to schedule an appointment for 2 weeks. I returned the patients call on 05/31 @12:45 PM. She said she needs to schedule an ultrasound and an appointment but cannot remember what she was told in the hospital. What type of appointments does she need to schedule in order for me to schedule her correctly.

## 2023-06-01 LAB
RPR SER QL: NON REACTIVE
RUBV IGG SERPL IA-ACNC: 2.01 INDEX
RUBV IGM SER IA-ACNC: <20 AU/ML (ref 0–19.9)

## 2023-06-02 ENCOUNTER — TELEPHONE (OUTPATIENT)
Age: 23
End: 2023-06-02

## 2023-06-02 NOTE — TELEPHONE ENCOUNTER
Patient left a voicemail on 06/02 @3:19 PM. Needs to schedule an appointment to be seen     I returned the patients call on 06/02 @3:40 PM. Appointment was scheduled with Dr. Chandrakant Vargas. She was seen in the ER and was told she is Pregnant.  LMP 02/06

## 2023-07-14 DIAGNOSIS — Z36.89 SCREENING, ANTENATAL, FOR FETAL ANATOMIC SURVEY: Primary | ICD-10-CM

## 2023-09-01 DIAGNOSIS — Z36.89 ENCOUNTER FOR ULTRASOUND TO CHECK FETAL GROWTH: Primary | ICD-10-CM

## 2024-07-26 ENCOUNTER — HOSPITAL ENCOUNTER (EMERGENCY)
Facility: HOSPITAL | Age: 24
Discharge: HOME | End: 2024-07-26
Payer: COMMERCIAL

## 2024-07-26 VITALS
WEIGHT: 180 LBS | HEIGHT: 68 IN | BODY MASS INDEX: 27.28 KG/M2 | RESPIRATION RATE: 16 BRPM | OXYGEN SATURATION: 98 % | SYSTOLIC BLOOD PRESSURE: 143 MMHG | DIASTOLIC BLOOD PRESSURE: 81 MMHG | TEMPERATURE: 98.2 F | HEART RATE: 87 BPM

## 2024-07-26 DIAGNOSIS — O26.899 PREGNANCY RELATED NAUSEA, ANTEPARTUM: Primary | ICD-10-CM

## 2024-07-26 DIAGNOSIS — R11.0 PREGNANCY RELATED NAUSEA, ANTEPARTUM: Primary | ICD-10-CM

## 2024-07-26 PROCEDURE — 99283 EMERGENCY DEPT VISIT LOW MDM: CPT

## 2024-07-26 PROCEDURE — 6370000000 HC RX 637 (ALT 250 FOR IP): Performed by: PHYSICIAN ASSISTANT

## 2024-07-26 RX ORDER — METOCLOPRAMIDE 5 MG/1
5 TABLET ORAL 4 TIMES DAILY
Qty: 15 TABLET | Refills: 0 | Status: SHIPPED | OUTPATIENT
Start: 2024-07-26

## 2024-07-26 RX ORDER — METOCLOPRAMIDE 10 MG/1
10 TABLET ORAL
Status: COMPLETED | OUTPATIENT
Start: 2024-07-26 | End: 2024-07-26

## 2024-07-26 RX ADMIN — METOCLOPRAMIDE 10 MG: 10 TABLET ORAL at 18:23

## 2024-07-26 NOTE — ED PROVIDER NOTES
[facility] EMERGENCY DEPT  EMERGENCY DEPARTMENT HISTORY AND PHYSICAL EXAM      Date: 7/26/2024  Patient Name: Morteza Montgomery  MRN: [id number]  YOB: 2000  Date of evaluation: 7/26/2024  Provider: Israel Pak PA-C   Note Started: 5:50 PM EDT 7/26/24    HISTORY OF PRESENT ILLNESS     Chief Complaint   Patient presents with    Nausea       History Provided By: Patient    HPI: Morteza Montgomery is a 23 y.o. female with no significant past medical history presents this ED requesting note to return to work.  Patient was reportedly seen at OSH earlier this afternoon at which time she tested positive for pregnancy.  States that when she was discharged she did not receive work note and she attempted to return to that hospital but they were too busy and she did not feel like waiting.  She does also complain of some nausea.  No fevers or chills.  Patient denies any associated abdominal pain, episodes of emesis, diarrhea, dysuria, frequency, hematuria, vaginal bleeding, vaginal discharge, rashes.  States that she otherwise well has no additional concerns.    PAST MEDICAL HISTORY   Past Medical History:  Past Medical History:   Diagnosis Date    Right wrist injury        Past Surgical History:  History reviewed. No pertinent surgical history.    Family History:  History reviewed. No pertinent family history.    Social History:  Social History     Tobacco Use    Smoking status: Never    Smokeless tobacco: Never   Substance Use Topics    Alcohol use: Never    Drug use: Never       Allergies:  No Known Allergies    PCP: Helder Fox MD    Current Meds:   No current facility-administered medications for this encounter.     Current Outpatient Medications   Medication Sig Dispense Refill    metoclopramide (REGLAN) 5 MG tablet Take 1 tablet by mouth 4 times daily 15 tablet 0       Social Determinants of Health:   Social Determinants of Health     Tobacco Use: Low Risk  (7/26/2024)    Patient History     Smoking Tobacco

## 2024-07-26 NOTE — ED TRIAGE NOTES
[place] seen at [facility] [place] and told she is pregnant, given work note and is now requesting note to return to work and meds for nausea. Pt attempted to be seen in the same ED as previously stated but the wait time was too long. +nausea. LMP 5/28/2024.

## 2024-08-05 ENCOUNTER — HOSPITAL ENCOUNTER (EMERGENCY)
Facility: HOSPITAL | Age: 24
Discharge: HOME | End: 2024-08-05
Attending: EMERGENCY MEDICINE
Payer: COMMERCIAL

## 2024-08-05 VITALS
HEIGHT: 68 IN | SYSTOLIC BLOOD PRESSURE: 141 MMHG | HEART RATE: 64 BPM | BODY MASS INDEX: 28.79 KG/M2 | RESPIRATION RATE: 16 BRPM | DIASTOLIC BLOOD PRESSURE: 76 MMHG | TEMPERATURE: 98 F | OXYGEN SATURATION: 100 % | WEIGHT: 190 LBS

## 2024-08-05 DIAGNOSIS — F41.1 ANXIETY STATE: ICD-10-CM

## 2024-08-05 DIAGNOSIS — O21.9 VOMITING OF PREGNANCY, ANTEPARTUM: Primary | ICD-10-CM

## 2024-08-05 DIAGNOSIS — R06.00 DYSPNEA, UNSPECIFIED TYPE: ICD-10-CM

## 2024-08-05 DIAGNOSIS — U07.1 COVID: ICD-10-CM

## 2024-08-05 LAB
ALBUMIN SERPL-MCNC: 2.9 G/DL (ref 3.5–5)
ALBUMIN/GLOB SERPL: 0.7 (ref 1.1–2.2)
ALP SERPL-CCNC: 28 U/L (ref 45–117)
ALT SERPL-CCNC: 15 U/L (ref 12–78)
ANION GAP SERPL CALC-SCNC: 7 MMOL/L (ref 5–15)
AST SERPL W P-5'-P-CCNC: 10 U/L (ref 15–37)
BASOPHILS # BLD: 0 K/UL (ref 0–0.1)
BASOPHILS NFR BLD: 0 % (ref 0–1)
BILIRUB SERPL-MCNC: 0.4 MG/DL (ref 0.2–1)
BUN SERPL-MCNC: 7 MG/DL (ref 6–20)
BUN/CREAT SERPL: 14 (ref 12–20)
CA-I BLD-MCNC: 8.7 MG/DL (ref 8.5–10.1)
CHLORIDE SERPL-SCNC: 105 MMOL/L (ref 97–108)
CO2 SERPL-SCNC: 27 MMOL/L (ref 21–32)
CREAT SERPL-MCNC: 0.51 MG/DL (ref 0.55–1.02)
DIFFERENTIAL METHOD BLD: ABNORMAL
EKG ATRIAL RATE: 63 BPM
EKG DIAGNOSIS: NORMAL
EKG P AXIS: 32 DEGREES
EKG P-R INTERVAL: 150 MS
EKG Q-T INTERVAL: 410 MS
EKG QRS DURATION: 92 MS
EKG QTC CALCULATION (BAZETT): 419 MS
EKG R AXIS: 48 DEGREES
EKG T AXIS: 16 DEGREES
EKG VENTRICULAR RATE: 63 BPM
EOSINOPHIL # BLD: 0.1 K/UL (ref 0–0.4)
EOSINOPHIL NFR BLD: 2 % (ref 0–7)
ERYTHROCYTE [DISTWIDTH] IN BLOOD BY AUTOMATED COUNT: 16 % (ref 11.5–14.5)
FLUAV RNA SPEC QL NAA+PROBE: NOT DETECTED
FLUBV RNA SPEC QL NAA+PROBE: ABNORMAL
GLOBULIN SER CALC-MCNC: 4.2 G/DL (ref 2–4)
GLUCOSE SERPL-MCNC: 83 MG/DL (ref 65–100)
HCT VFR BLD AUTO: 31 % (ref 35–47)
HGB BLD-MCNC: 10.1 G/DL (ref 11.5–16)
IMM GRANULOCYTES # BLD AUTO: 0 K/UL (ref 0–0.04)
IMM GRANULOCYTES NFR BLD AUTO: 0 % (ref 0–0.5)
LYMPHOCYTES # BLD: 2.7 K/UL (ref 0.8–3.5)
LYMPHOCYTES NFR BLD: 38 % (ref 12–49)
MCH RBC QN AUTO: 26.4 PG (ref 26–34)
MCHC RBC AUTO-ENTMCNC: 32.6 G/DL (ref 30–36.5)
MCV RBC AUTO: 80.9 FL (ref 80–99)
MONOCYTES # BLD: 0.5 K/UL (ref 0–1)
MONOCYTES NFR BLD: 7 % (ref 5–13)
NEUTS SEG # BLD: 3.8 K/UL (ref 1.8–8)
NEUTS SEG NFR BLD: 53 % (ref 32–75)
PLATELET # BLD AUTO: 215 K/UL (ref 150–400)
PMV BLD AUTO: 11.1 FL (ref 8.9–12.9)
POTASSIUM SERPL-SCNC: 3.6 MMOL/L (ref 3.5–5.1)
PROT SERPL-MCNC: 7.1 G/DL (ref 6.4–8.2)
RBC # BLD AUTO: 3.83 M/UL (ref 3.8–5.2)
SARS-COV-2 RNA RESP QL NAA+PROBE: DETECTED
SODIUM SERPL-SCNC: 139 MMOL/L (ref 136–145)
TROPONIN I SERPL HS-MCNC: 5 NG/L (ref 0–51)
WBC # BLD AUTO: 7.2 K/UL (ref 3.6–11)

## 2024-08-05 PROCEDURE — 96374 THER/PROPH/DIAG INJ IV PUSH: CPT

## 2024-08-05 PROCEDURE — 93005 ELECTROCARDIOGRAM TRACING: CPT | Performed by: EMERGENCY MEDICINE

## 2024-08-05 PROCEDURE — 6360000002 HC RX W HCPCS: Performed by: EMERGENCY MEDICINE

## 2024-08-05 PROCEDURE — 99284 EMERGENCY DEPT VISIT MOD MDM: CPT

## 2024-08-05 PROCEDURE — 85025 COMPLETE CBC W/AUTO DIFF WBC: CPT

## 2024-08-05 PROCEDURE — 80053 COMPREHEN METABOLIC PANEL: CPT

## 2024-08-05 PROCEDURE — 6370000000 HC RX 637 (ALT 250 FOR IP): Performed by: EMERGENCY MEDICINE

## 2024-08-05 PROCEDURE — 87636 SARSCOV2 & INF A&B AMP PRB: CPT

## 2024-08-05 PROCEDURE — 2580000003 HC RX 258: Performed by: EMERGENCY MEDICINE

## 2024-08-05 PROCEDURE — 84484 ASSAY OF TROPONIN QUANT: CPT

## 2024-08-05 RX ORDER — ONDANSETRON 4 MG/1
4 TABLET, ORALLY DISINTEGRATING ORAL ONCE
Status: COMPLETED | OUTPATIENT
Start: 2024-08-05 | End: 2024-08-05

## 2024-08-05 RX ORDER — 0.9 % SODIUM CHLORIDE 0.9 %
1000 INTRAVENOUS SOLUTION INTRAVENOUS ONCE
Status: COMPLETED | OUTPATIENT
Start: 2024-08-05 | End: 2024-08-05

## 2024-08-05 RX ORDER — ONDANSETRON 2 MG/ML
4 INJECTION INTRAMUSCULAR; INTRAVENOUS ONCE
Status: COMPLETED | OUTPATIENT
Start: 2024-08-05 | End: 2024-08-05

## 2024-08-05 RX ORDER — ONDANSETRON 4 MG/1
4 TABLET, ORALLY DISINTEGRATING ORAL 3 TIMES DAILY PRN
Qty: 9 TABLET | Refills: 0 | Status: SHIPPED | OUTPATIENT
Start: 2024-08-05 | End: 2024-08-09

## 2024-08-05 RX ADMIN — SODIUM CHLORIDE 1000 ML: 9 INJECTION, SOLUTION INTRAVENOUS at 19:09

## 2024-08-05 RX ADMIN — ONDANSETRON 4 MG: 2 INJECTION INTRAMUSCULAR; INTRAVENOUS at 19:09

## 2024-08-05 RX ADMIN — ONDANSETRON 4 MG: 4 TABLET, ORALLY DISINTEGRATING ORAL at 17:54

## 2024-08-05 NOTE — ED PROVIDER NOTES
[facility] EMERGENCY DEPT  EMERGENCY DEPARTMENT HISTORY AND PHYSICAL EXAM      Date: 2024  Patient Name: Morteza Montgomery  MRN: [id number]  YOB: 2000  Date of evaluation: 2024  Provider: Noel Colin MD   Note Started: 6:28 PM EDT 24    HISTORY OF PRESENT ILLNESS     Chief Complaint   Patient presents with    Shortness of Breath    Anxiety    Nausea       History Provided By: Patient    HPI: Morteza Montgomery is a 23 y.o. female G2,  currently 8 weeks pregnant by dates presenting with shortness of breath, nausea, vomiting.  Patient states symptoms started this morning.  No recent cough, fevers, chest pain.  Patient has been under a lot of stress because she is currently 9 months postpartum and did not mean to get pregnant.  She has an appointment coming up with her OB/GYN at the end of this month to discuss options for this pregnancy.  She has a history of preeclampsia with her last pregnancy.    PAST MEDICAL HISTORY   Past Medical History:  Past Medical History:   Diagnosis Date    Right wrist injury        Past Surgical History:  No past surgical history on file.    Family History:  No family history on file.    Social History:  Social History     Tobacco Use    Smoking status: Never    Smokeless tobacco: Never   Substance Use Topics    Alcohol use: Never    Drug use: Never       Allergies:  No Known Allergies    PCP: Helder Fox MD    Current Meds:   No current facility-administered medications for this encounter.     Current Outpatient Medications   Medication Sig Dispense Refill    ondansetron (ZOFRAN-ODT) 4 MG disintegrating tablet Take 1 tablet by mouth 3 times daily as needed for Nausea or Vomiting 9 tablet 0    metoclopramide (REGLAN) 5 MG tablet Take 1 tablet by mouth 4 times daily 15 tablet 0       Social Determinants of Health:   Social Determinants of Health     Tobacco Use: Low Risk  (2024)    Patient History     Smoking Tobacco Use: Never     Smokeless Tobacco

## 2024-08-05 NOTE — DISCHARGE INSTRUCTIONS
10 (L) 15 - 37 U/L    ALT 15 12 - 78 U/L    Alk Phosphatase 28 (L) 45 - 117 U/L    Total Protein 7.1 6.4 - 8.2 g/dL    Albumin 2.9 (L) 3.5 - 5.0 g/dL    Globulin 4.2 (H) 2.0 - 4.0 g/dL    Albumin/Globulin Ratio 0.7 (L) 1.1 - 2.2     Troponin    Collection Time: 08/05/24  6:12 PM   Result Value Ref Range    Troponin, High Sensitivity 5 0 - 51 ng/L   COVID-19 & Influenza Combo    Collection Time: 08/05/24  6:15 PM    Specimen: Nasopharyngeal   Result Value Ref Range    SARS-CoV-2, PCR DETECTED (A) Not Detected      Rapid Influenza A By PCR Not Detected Not Detected      Rapid Influenza B By PCR None Detected (A) Not Detected         Radiologic Studies  No orders to display     ------------------------------------------------------------------------------------------------------------  The evaluation and treatment you received in the Emergency Department were for an urgent problem. It is important that you follow-up with a doctor, nurse practitioner, or physician assistant to:  (1) confirm your diagnosis,  (2) re-evaluation of changes in your illness and treatment, and (3) for ongoing care. Please take your discharge instructions with you when you go to your follow-up appointment.     If you have any problem arranging a follow-up appointment, contact us!  If your symptoms become worse or you do not improve as expected, please return to us. We are available 24 hours a day.     If a prescription has been provided, please fill it as soon as possible to prevent a delay in treatment. If you have any questions or reservations about taking the medication due to side effects or interactions with other medications, please call your primary care provider or contact us directly.  Again, THANK YOU for choosing us to care for YOU!

## 2024-08-05 NOTE — ED TRIAGE NOTES
Presents to ER with c/o shortness of breath with exertion. States she also has some nausea. Pt states she is 8 weeks pregnant. Pt states she has anxiety.

## 2024-08-15 ENCOUNTER — INITIAL PRENATAL (OUTPATIENT)
Age: 24
End: 2024-08-15

## 2024-08-15 ENCOUNTER — TELEPHONE (OUTPATIENT)
Age: 24
End: 2024-08-15

## 2024-08-15 VITALS
SYSTOLIC BLOOD PRESSURE: 124 MMHG | WEIGHT: 205.19 LBS | RESPIRATION RATE: 16 BRPM | HEART RATE: 74 BPM | OXYGEN SATURATION: 99 % | HEIGHT: 68 IN | BODY MASS INDEX: 31.1 KG/M2 | TEMPERATURE: 97.8 F | DIASTOLIC BLOOD PRESSURE: 70 MMHG

## 2024-08-15 DIAGNOSIS — Z87.59 HISTORY OF PRE-ECLAMPSIA: ICD-10-CM

## 2024-08-15 DIAGNOSIS — Z34.82 PRENATAL CARE, SUBSEQUENT PREGNANCY IN SECOND TRIMESTER: Primary | ICD-10-CM

## 2024-08-15 DIAGNOSIS — Z98.891 HISTORY OF CESAREAN SECTION: ICD-10-CM

## 2024-08-15 DIAGNOSIS — Z11.3 SCREENING EXAMINATION FOR STD (SEXUALLY TRANSMITTED DISEASE): ICD-10-CM

## 2024-08-15 PROCEDURE — 0501F PRENATAL FLOW SHEET: CPT | Performed by: OBSTETRICS & GYNECOLOGY

## 2024-08-15 RX ORDER — ASPIRIN 81 MG/1
81 TABLET ORAL DAILY
Qty: 90 TABLET | Refills: 0 | Status: SHIPPED | OUTPATIENT
Start: 2024-08-15

## 2024-08-15 NOTE — PROGRESS NOTES
Morteza is a 23 y.o. female who presents today for the following:    Chief Complaint   Patient presents with    Initial Prenatal Visit        No Known Allergies       Current Outpatient Medications:     aspirin 81 MG EC tablet, Take 1 tablet by mouth daily, Disp: 90 tablet, Rfl: 0    metoclopramide (REGLAN) 5 MG tablet, Take 1 tablet by mouth 4 times daily, Disp: 15 tablet, Rfl: 0     Past Medical History:   Diagnosis Date    Right wrist injury         History reviewed. No pertinent surgical history.     History reviewed. No pertinent family history.     Social History     Socioeconomic History    Marital status: Single     Spouse name: None    Number of children: None    Years of education: None    Highest education level: None   Tobacco Use    Smoking status: Never    Smokeless tobacco: Never   Vaping Use    Vaping status: Never Used   Substance and Sexual Activity    Alcohol use: Never    Drug use: Never        HPI   Here today for initial OB visit.  23 years old -0-1-1 with history of preeclampsia and previous  section.    Review of Systems     REVIEW OF SYSTEMS:  Constitutional: Negative  HEENT: Negative  Eyes: Negative  Respiratory: Negative  Cardiovascular: Negative  Gastrointestinal: Negative  Genitourinary: Positive for pregnancy  Musculoskeletal: Negative  Skin: Negative  Neurological: Negative  Endo/heme: Negative  Psychiatric/behavioral: Negative    Vitals:    08/15/24 1346   BP: 124/70   Pulse: 74   Resp: 16   Temp: 97.8 °F (36.6 °C)   SpO2: 99%      OBGyn Exam   Constitutional  .  Appearance: Well-nourished, well-developed, alert, in no acute distress    HEENT  .  Head and face: Appears normal    Neck  .  Inspection/palpation: Normal appearance, no masses or tenderness  .  Lymph nodes: No lymphadenopathy present  .  Thyroid: Gland size normal, nontender, no nodules or masses present on palpation    Chest  .  Respiratory effort: Even and unlabored  .  Auscultation: Normal breath

## 2024-08-16 DIAGNOSIS — O99.011 ANEMIA COMPLICATING PREGNANCY, FIRST TRIMESTER: Primary | ICD-10-CM

## 2024-08-16 LAB
ABO GROUP BLD: NORMAL
BACTERIA UR CULT: NORMAL
BASOPHILS # BLD AUTO: 0 X10E3/UL (ref 0–0.2)
BASOPHILS NFR BLD AUTO: 1 %
BLD GP AB SCN SERPL QL: NEGATIVE
EOSINOPHIL # BLD AUTO: 0.1 X10E3/UL (ref 0–0.4)
EOSINOPHIL NFR BLD AUTO: 2 %
ERYTHROCYTE [DISTWIDTH] IN BLOOD BY AUTOMATED COUNT: 15.1 % (ref 11.7–15.4)
HBV SURFACE AG SERPL QL IA: NEGATIVE
HCT VFR BLD AUTO: 29.2 % (ref 34–46.6)
HCV IGG SERPL QL IA: NON REACTIVE
HGB BLD-MCNC: 9.4 G/DL (ref 11.1–15.9)
HIV 1+2 AB+HIV1 P24 AG SERPL QL IA: NON REACTIVE
IMM GRANULOCYTES # BLD AUTO: 0 X10E3/UL (ref 0–0.1)
IMM GRANULOCYTES NFR BLD AUTO: 0 %
LYMPHOCYTES # BLD AUTO: 2.5 X10E3/UL (ref 0.7–3.1)
LYMPHOCYTES NFR BLD AUTO: 33 %
MCH RBC QN AUTO: 26.2 PG (ref 26.6–33)
MCHC RBC AUTO-ENTMCNC: 32.2 G/DL (ref 31.5–35.7)
MCV RBC AUTO: 81 FL (ref 79–97)
MONOCYTES # BLD AUTO: 0.7 X10E3/UL (ref 0.1–0.9)
MONOCYTES NFR BLD AUTO: 8 %
NEUTROPHILS # BLD AUTO: 4.4 X10E3/UL (ref 1.4–7)
NEUTROPHILS NFR BLD AUTO: 56 %
PLATELET # BLD AUTO: 220 X10E3/UL (ref 150–450)
RBC # BLD AUTO: 3.59 X10E6/UL (ref 3.77–5.28)
RH BLD: POSITIVE
RUBV IGG SERPL IA-ACNC: 2.05 INDEX
TREPONEMA PALLIDUM IGG+IGM AB [PRESENCE] IN SERUM OR PLASMA BY IMMUNOASSAY: NON REACTIVE
VZV IGG SER IA-ACNC: 414 INDEX
WBC # BLD AUTO: 7.8 X10E3/UL (ref 3.4–10.8)

## 2024-08-16 RX ORDER — FERROUS SULFATE 325(65) MG
325 TABLET ORAL 2 TIMES DAILY
Qty: 120 TABLET | Refills: 5 | Status: SHIPPED | OUTPATIENT
Start: 2024-08-16

## 2024-08-16 NOTE — RESULT ENCOUNTER NOTE
Low hemoglobin, anemia pregnancy.  Ferrous sulfate prescription sent to the pharmacy.  Please advise patient to complete.  Thank you

## 2024-08-18 LAB
C TRACH RRNA SPEC QL NAA+PROBE: NEGATIVE
N GONORRHOEA RRNA SPEC QL NAA+PROBE: NEGATIVE
T VAGINALIS RRNA SPEC QL NAA+PROBE: NEGATIVE

## 2024-08-19 LAB
HGB A MFR BLD ELPH: 97.6 % (ref 96.4–98.8)
HGB A2 MFR BLD ELPH: 2.4 % (ref 1.8–3.2)
HGB F MFR BLD ELPH: 0 % (ref 0–2)
HGB FRACT BLD-IMP: NORMAL
HGB S MFR BLD ELPH: 0 %

## 2024-08-21 LAB
CFDNA.FET/CFDNA.TOTAL SFR FETUS: NORMAL %
CITATION REF LAB TEST: NORMAL
FET 13+18+21+X+Y ANEUP PLAS.CFDNA: NEGATIVE
FET CHR 21 TS PLAS.CFDNA QL: NEGATIVE
FET SEX PLAS.CFDNA DOSAGE CFDNA: NORMAL
FET TS 13 RISK PLAS.CFDNA QL: NEGATIVE
FET TS 18 RISK WBC.DNA+CFDNA QL: NEGATIVE
GA EST FROM CONCEPTION DATE: NORMAL D
GESTATIONAL AGE > OR = 9 WEEKS: YES
LAB DIRECTOR NAME PROVIDER: NORMAL
LAB DIRECTOR NAME PROVIDER: NORMAL
LABORATORY COMMENT REPORT: NORMAL
LIMITATIONS OF THE TEST: NORMAL
Lab: NORMAL
NEGATIVE PREDICTIVE VALUE: NORMAL
PERFORMANCE CHARACTERISTICS: NORMAL
POSITIVE PREDICTIVE VALUE: NORMAL
REF LAB TEST METHOD: NORMAL
TEST PERFORMANCE INFO SPEC: NORMAL

## 2024-08-26 LAB
CITATION REF LAB TEST: ABNORMAL
CLINICAL INFO: ABNORMAL
ETHNIC BACKGROUND STATED: ABNORMAL
GENE DIS ANL CARRIER INTERP-IMP: ABNORMAL
GENE STUDIED ID: ABNORMAL
INDICATION: ABNORMAL
LAB DIRECTOR NAME PROVIDER: ABNORMAL
Lab: ABNORMAL
MOL DX INTERP BLD/T QL: ABNORMAL
RECOMMENDATION PATIENT DOC-IMP: ABNORMAL
REF LAB TEST METHOD: ABNORMAL
SERVICE CMNT-IMP: ABNORMAL
SPECIMEN SOURCE: ABNORMAL

## 2024-08-28 ENCOUNTER — TELEPHONE (OUTPATIENT)
Age: 24
End: 2024-08-28

## 2024-08-28 DIAGNOSIS — Z14.8 CARRIER OF SPINAL MUSCULAR ATROPHY: Primary | ICD-10-CM

## 2024-08-28 NOTE — TELEPHONE ENCOUNTER
Spoke with patient in regards to her appointment. Appointment scheduled appointment details provided

## 2024-09-30 ENCOUNTER — TELEMEDICINE (OUTPATIENT)
Age: 24
End: 2024-09-30
Payer: COMMERCIAL

## 2024-09-30 ENCOUNTER — ROUTINE PRENATAL (OUTPATIENT)
Age: 24
End: 2024-09-30
Payer: COMMERCIAL

## 2024-09-30 VITALS — DIASTOLIC BLOOD PRESSURE: 84 MMHG | HEART RATE: 92 BPM | SYSTOLIC BLOOD PRESSURE: 119 MMHG

## 2024-09-30 DIAGNOSIS — Z87.59 HISTORY OF PRE-ECLAMPSIA: Primary | ICD-10-CM

## 2024-09-30 DIAGNOSIS — Z3A.19 19 WEEKS GESTATION OF PREGNANCY: ICD-10-CM

## 2024-09-30 DIAGNOSIS — Z14.8 CARRIER OF SPINAL MUSCULAR ATROPHY: ICD-10-CM

## 2024-09-30 DIAGNOSIS — O28.5 ABNORMAL CHROMOSOMAL AND GENETIC FINDING ON ANTENATAL SCREENING OF MOTHER: Primary | ICD-10-CM

## 2024-09-30 PROCEDURE — 76816 OB US FOLLOW-UP PER FETUS: CPT | Performed by: STUDENT IN AN ORGANIZED HEALTH CARE EDUCATION/TRAINING PROGRAM

## 2024-09-30 PROCEDURE — 99204 OFFICE O/P NEW MOD 45 MIN: CPT | Performed by: STUDENT IN AN ORGANIZED HEALTH CARE EDUCATION/TRAINING PROGRAM

## 2024-09-30 PROCEDURE — 76811 OB US DETAILED SNGL FETUS: CPT | Performed by: STUDENT IN AN ORGANIZED HEALTH CARE EDUCATION/TRAINING PROGRAM

## 2024-09-30 PROCEDURE — 96040 PR MEDICAL GENETICS COUNSELING EACH 30 MINUTES: CPT | Performed by: COUNSELOR

## 2024-09-30 NOTE — PROGRESS NOTES
Morteza Montgomery is a 23 y.o. female seen on 24 in our [place] office for genetic counseling regarding her abnormal carrier screening results. Morteza Montgomery will be 24 at the Estimated Date of Delivery: 25; . Genetic counseling was performed via Telemedicine today. The patient was accompanied to her appointment by her partner and father of the baby (FOB), Heidi.    Impressions and Recommendations:    - The patient's carrier screening results indicated that she is at an increased risk of 1 in 34 to be a carrier of spinal muscular atrophy (SMA) due to the presence of the SMA linked variant.   - These results were reviewed, including relevant genetic risks, screening and testing options.  - The couple ELECTED to proceed with SMA carrier screening for the FOB; however, they would like to wait and have this ordered through their OB's office.  - The patient's otherwise normal carrier screening results were also reviewed.  - The patient's normal NIPT results were reviewed today.  - The patient DECLINED UNITY single gene NIPT at this time.  - The patient DECLINED diagnostic amniocentesis at this time.  - An msAFP is recommended between 15 and 24 weeks to screen for open neural tube defects in the current pregnancy.  - An ultrasound and MFM consult were performed today by Dr. Melinda Pace MD. Please see her note for further details    Family and pregnancy histories were taken. The following information was discussed with the patient:    Genetic Screening:    Morteza previously had carrier screening for Cystic Fibrosis and Spinal Muscular Atrophy ordered by her OB's office through [facility]. Her results indicated that she is at an increased risk to be a carrier of spinal muscular atrophy and that she was negative, or not a carrier for Cystic Fibrosis. These otherwise negative results significantly reduce, but do not entirely eliminate, the chance that she is a carrier and therefore the risk

## 2024-09-30 NOTE — PROCEDURES
PATIENT: IRVIN GODINEZ   -  : 2000   -  DOS:2024   -  INTERPRETING PROVIDER:Melinda Pace,   Indication  ========    History of preeclampsia, carrier of spinal muscular atrophy    Method  ======    Transabdominal ultrasound examination. View: Suboptimal view: limited by maternal body habitus. Suboptimal view: limited by fetal position    Dating  ======    LMP on: 2024  Cycle: irregular cycle  GA by LMP 13 w + 3 d  MASHA by LMP: 2025  Previous Ultrasound on: 8/15/2024  Type of prior assessment: GA  GA at prior assessment date 13 w + 2 d  GA by previous U/S 19 w + 6 d  MASHA by previous Ultrasound: 2025  Ultrasound examination on: 2024  GA by U/S based upon: AC, BPD, Femur, HC  GA by U/S 19 w + 0 d  MASHA by U/S: 2025  Assigned: based on ultrasound (GA), selected on 2024  Assigned GA 19 w + 6 d  Assigned MASHA: 2025    Fetal Growth Overview  =================    Exam date        GA              BPD (mm)          HC (mm)             AC (mm)              FL (mm)             HL (mm)             EFW (g)  2024        19w 6d        42.6     14%        156.1    3%        135.3     18%        30.7    30%        30.2     58%        277    14%    Fetal Biometry  ============    Standard  BPD 42.6 mm 18w 6d 14% Hadlock  OFD 53.7 mm 19w 1d 26% Sung  .1 mm 18w 4d 3% Hadlock  .3 mm 19w 0d 18% Hadlock  Femur 30.7 mm 19w 4d 30% Hadlock  Humerus 30.2 mm 20w 0d 58% Sung   g 19w 0d 14% Hadlock  EFW (lb) 0 lb  EFW (oz) 10 oz  EFW by: Hadlock (BPD-HC-AC-FL)  Head / Face / Neck  Nasal bone: present  Other Structures   bpm    General Evaluation  ==============    Cardiac activity present.  bpm. Fetal movements: visualized. Presentation: Cephalic  Placenta: Placental site: fundal  Umbilical cord: Cord vessels: 3 vessel cord. Insertion site: central  Amniotic fluid: Amount of AF: normal. MVP 4.7 cm    Fetal

## 2024-09-30 NOTE — PROGRESS NOTES
Patient was seen 9/30/2024      Please look under media to view full consult and ultrasound report in View[employer].         Melinda Pace MD   Maternal Fetal Medicine

## 2025-01-13 ENCOUNTER — HOSPITAL ENCOUNTER (OUTPATIENT)
Facility: HOSPITAL | Age: 25
Discharge: HOME | End: 2025-01-13
Attending: OBSTETRICS & GYNECOLOGY | Admitting: OBSTETRICS & GYNECOLOGY

## 2025-01-13 VITALS
TEMPERATURE: 98.2 F | HEIGHT: 68 IN | RESPIRATION RATE: 18 BRPM | WEIGHT: 210 LBS | SYSTOLIC BLOOD PRESSURE: 125 MMHG | DIASTOLIC BLOOD PRESSURE: 61 MMHG | HEART RATE: 98 BPM | BODY MASS INDEX: 31.83 KG/M2

## 2025-01-13 LAB
APPEARANCE UR: ABNORMAL
BILIRUB UR QL: NEGATIVE
COLOR UR: ABNORMAL
GLUCOSE UR STRIP.AUTO-MCNC: NEGATIVE MG/DL
HGB UR QL STRIP: NEGATIVE
KETONES UR QL STRIP.AUTO: NEGATIVE MG/DL
LEUKOCYTE ESTERASE UR QL STRIP.AUTO: ABNORMAL
MUCOUS THREADS URNS QL MICRO: ABNORMAL /LPF
NITRITE UR QL STRIP.AUTO: NEGATIVE
PH UR STRIP: 6 (ref 5–8)
PROT UR STRIP-MCNC: NEGATIVE MG/DL
RBC #/AREA URNS HPF: ABNORMAL /HPF (ref 0–5)
SP GR UR REFRACTOMETRY: 1.02 (ref 1–1.03)
UROBILINOGEN UR QL STRIP.AUTO: 0.1 EU/DL (ref 0.1–1)
WBC URNS QL MICRO: ABNORMAL /HPF (ref 0–4)

## 2025-01-13 PROCEDURE — 4500000002 HC ER NO CHARGE

## 2025-01-13 PROCEDURE — 81001 URINALYSIS AUTO W/SCOPE: CPT

## 2025-01-13 PROCEDURE — 99213 OFFICE O/P EST LOW 20 MIN: CPT

## 2025-01-13 PROCEDURE — 99212 OFFICE O/P EST SF 10 MIN: CPT

## 2025-01-13 ASSESSMENT — ENCOUNTER SYMPTOMS: ABDOMINAL PAIN: 1

## 2025-01-14 LAB
BACTERIA URNS QL MICRO: NEGATIVE /HPF
EPITH CASTS URNS QL MICRO: ABNORMAL /LPF
RBC #/AREA URNS HPF: ABNORMAL /HPF (ref 0–5)
WBC URNS QL MICRO: ABNORMAL /HPF (ref 0–4)

## 2025-01-14 NOTE — PROGRESS NOTES
2149 Pt arrived from Ed to LD 8, pt endorses fetal movement, denies vaginal bleeding or leaking of fluid. Cc of lower back pain and ABD pain. Intermittent 2/10   2217 margarita Gibson RN fingertip     2230 MD Jon called orders received     2300 Md at bedside verbal order to discharge once UA resulted

## 2025-01-14 NOTE — H&P
SHAYNA Evaluation    Name: Morteza Montgomery MRN: [id number]  SSN: [id number]    YOB: 2000  Age: 24 y.o.  Sex: female      Subjective:     Reason for Admission:  Pregnancy and abdominal pain    History of Present Illness: Morteza Montgomery is a 24 y.o.  female with an estimated gestational age of 34w6d with Estimated Date of Delivery: 25. Patient complains of mild abdominal pain for that started earlier in the day. Patient denies vaginal bleeding  and vaginal leaking of fluid .    OB History          3    Para   1    Term   1            AB   1    Living   1         SAB        IAB   1    Ectopic        Molar        Multiple        Live Births   1              Past Medical History:   Diagnosis Date    Right wrist injury      Past Surgical History:   Procedure Laterality Date     SECTION       Social History     Occupational History    Not on file   Tobacco Use    Smoking status: Never    Smokeless tobacco: Never   Vaping Use    Vaping status: Never Used   Substance and Sexual Activity    Alcohol use: Never    Drug use: Never    Sexual activity: Yes     Partners: Male     No family history on file.    No Known Allergies  Prior to Admission medications    Medication Sig Start Date End Date Taking? Authorizing Provider   ferrous sulfate (IRON 325) 325 (65 Fe) MG tablet Take 1 tablet by mouth 2 times daily  Patient not taking: Reported on 2025   Hema Delgado MD   aspirin 81 MG EC tablet Take 1 tablet by mouth daily  Patient not taking: Reported on 2025 8/15/24   Hema Delgado MD   metoclopramide (REGLAN) 5 MG tablet Take 1 tablet by mouth 4 times daily  Patient not taking: Reported on 2025   Israel Pak PA-C        Review of Systems   Gastrointestinal:  Positive for abdominal pain.   All other systems reviewed and are negative.      Objective:     Vitals:    Vitals:    25 2206   BP: 125/61

## 2025-02-06 ENCOUNTER — ROUTINE PRENATAL (OUTPATIENT)
Age: 25
End: 2025-02-06
Payer: COMMERCIAL

## 2025-02-06 VITALS — DIASTOLIC BLOOD PRESSURE: 65 MMHG | SYSTOLIC BLOOD PRESSURE: 112 MMHG | HEART RATE: 71 BPM

## 2025-02-06 DIAGNOSIS — Z87.59 HISTORY OF PRE-ECLAMPSIA: Primary | ICD-10-CM

## 2025-02-06 PROCEDURE — 99213 OFFICE O/P EST LOW 20 MIN: CPT | Performed by: STUDENT IN AN ORGANIZED HEALTH CARE EDUCATION/TRAINING PROGRAM

## 2025-02-06 PROCEDURE — 76816 OB US FOLLOW-UP PER FETUS: CPT | Performed by: STUDENT IN AN ORGANIZED HEALTH CARE EDUCATION/TRAINING PROGRAM

## 2025-02-06 NOTE — PROGRESS NOTES
Please see ultrasound report under Imaging tab or Media tab.  Leann Ward MD  [facility]

## 2025-02-06 NOTE — PROGRESS NOTES
Patient missed/no show with appts at OB and [facility] office. Pt. came in for an appt. In [facility] today.  Called Dr. Bejarano and scheduled her for an appt at his [place] location at 07:30 AM tomorrow. Patient verbalized understanding and stated that she will report to her OB office tomorrow for her appt. All questions answered.

## 2025-02-06 NOTE — PROCEDURES
PATIENT: IRVIN GODINEZ   -  : 2000   -  DOS:2025   -  INTERPRETING PROVIDER:Leann Ward,   Indication  ========    History of preeclampsia, carrier of spinal muscular atrophy    Method  ======    Transabdominal ultrasound examination. View: suboptimal due to advanced gestational age and maternal acoustic properties    Pregnancy  =========    Lopez pregnancy. Number of fetuses: 1    Dating  ======    LMP on: 2024  Cycle: irregular cycle  GA by LMP 31 w + 6 d  MASHA by LMP: 2025  Previous Ultrasound on: 8/15/2024  Type of prior assessment: GA  GA at prior assessment date 13 w + 2 d  GA by previous U/S 38 w + 2 d  MASHA by previous Ultrasound: 2025  Ultrasound examination on: 2025  GA by U/S based upon: AC, BPD, Femur, HC  GA by U/S 36 w + 0 d  MASHA by U/S: 3/6/2025  Assigned: based on ultrasound (GA), selected on 2024  Assigned GA 38 w + 2 d  Assigned MASHA: 2025    Fetal Biometry  ============    Standard  BPD 86.7 mm 35w 0d 4% Hadlock  .4 mm 39w 3d 67% Sung  .4 mm 36w 1d 3% Hadlock  .3 mm 36w 2d 15% Hadlock  Femur 71.8 mm 36w 5d 18% Hadlock  EFW 2,882 g 36w 2d 17% Hadlock  EFW (lb) 6 lb  EFW (oz) 6 oz  EFW by: Hadlock (BPD-HC-AC-FL)  Extended   4.6 mm  CM 7.8 mm  55% Nicolaides  Rt Renal pelvis ap 6.4 mm  Lt Renal pelvis ap 5.3 mm  Other Structures   bpm    General Evaluation  ==============    Cardiac activity present.  bpm. Fetal movements: visualized. Presentation: Cephalic  Placenta: Placental site: fundal  Umbilical cord: Cord vessels: 3 vessel cord. Insertion site: central  Amniotic fluid: Amount of AF: polyhydramnios- MVP 8.2cm. MVP 8.2 cm. DEDRICK 19.7 cm. Q1 3.9 cm, Q2 8.2 cm, Q3 3.5 cm, Q4 4.1 cm    Fetal Anatomy  ===========    Lateral ventricles: normal  Cavum septi pellucidi: normal  Cerebellum: SUBOPTIMAL  Cisterna magna: SUBOPTIMAL  Head / Neck  Vermis: NOT VISUALIZED  Neck: NOT

## 2025-02-07 ENCOUNTER — ROUTINE PRENATAL (OUTPATIENT)
Age: 25
End: 2025-02-07

## 2025-02-07 VITALS
HEART RATE: 72 BPM | TEMPERATURE: 98 F | HEIGHT: 68 IN | WEIGHT: 232.31 LBS | BODY MASS INDEX: 35.21 KG/M2 | RESPIRATION RATE: 16 BRPM | DIASTOLIC BLOOD PRESSURE: 79 MMHG | SYSTOLIC BLOOD PRESSURE: 122 MMHG | OXYGEN SATURATION: 99 %

## 2025-02-07 DIAGNOSIS — Z34.83 PRENATAL CARE, SUBSEQUENT PREGNANCY IN THIRD TRIMESTER: Primary | ICD-10-CM

## 2025-02-07 DIAGNOSIS — Z98.891 HISTORY OF CESAREAN SECTION: ICD-10-CM

## 2025-02-07 DIAGNOSIS — O09.30 POOR PATIENT ATTENDANCE OF ANTENATAL CARE: ICD-10-CM

## 2025-02-07 PROCEDURE — 0502F SUBSEQUENT PRENATAL CARE: CPT | Performed by: OBSTETRICS & GYNECOLOGY

## 2025-02-07 NOTE — PROGRESS NOTES
24 y.o.   at 38w3d, BTG: O Positive     Complains of: Pelvic pressure.    1. Prenatal care, subsequent pregnancy in third trimester    - Group B Strep by VANESSA  - Chlamydia, Gonorrhea, Trichomoniasis    2. History of  section  Will be scheduled for an elective, repeat  section on 2025    3. Poor patient attendance of  care          Return in about 3 weeks (around 2025) for Postoperative follow-up.     Hema Diego M.D.  [facility]  Obstetrics and Gynecology  [phone]

## 2025-02-09 LAB — GP B STREP DNA SPEC QL NAA+PROBE: NEGATIVE

## 2025-02-11 ENCOUNTER — ANESTHESIA (OUTPATIENT)
Facility: HOSPITAL | Age: 25
End: 2025-02-11
Payer: MEDICAID

## 2025-02-12 ENCOUNTER — HOSPITAL ENCOUNTER (INPATIENT)
Facility: HOSPITAL | Age: 25
LOS: 3 days | Discharge: HOME | End: 2025-02-15
Attending: OBSTETRICS & GYNECOLOGY | Admitting: OBSTETRICS & GYNECOLOGY
Payer: MEDICAID

## 2025-02-12 ENCOUNTER — ANESTHESIA EVENT (OUTPATIENT)
Facility: HOSPITAL | Age: 25
End: 2025-02-12
Payer: MEDICAID

## 2025-02-12 PROBLEM — A59.01 TRICHOMONAL VAGINITIS DURING PREGNANCY IN THIRD TRIMESTER: Status: ACTIVE | Noted: 2025-02-12

## 2025-02-12 PROBLEM — Z87.59 HISTORY OF PRE-ECLAMPSIA: Status: RESOLVED | Noted: 2024-08-15 | Resolved: 2025-02-12

## 2025-02-12 PROBLEM — O99.011 ANEMIA COMPLICATING PREGNANCY, FIRST TRIMESTER: Status: RESOLVED | Noted: 2024-08-16 | Resolved: 2025-02-12

## 2025-02-12 PROBLEM — Z3A.39 39 WEEKS GESTATION OF PREGNANCY: Status: ACTIVE | Noted: 2025-02-12

## 2025-02-12 PROBLEM — O23.593 TRICHOMONAL VAGINITIS DURING PREGNANCY IN THIRD TRIMESTER: Status: ACTIVE | Noted: 2025-02-12

## 2025-02-12 PROBLEM — O99.013 ANEMIA COMPLICATING PREGNANCY, THIRD TRIMESTER: Status: ACTIVE | Noted: 2025-02-12

## 2025-02-12 PROBLEM — R10.9 ABDOMINAL PAIN: Status: RESOLVED | Noted: 2023-05-30 | Resolved: 2025-02-12

## 2025-02-12 LAB
AMPHET UR QL SCN: NEGATIVE
APPEARANCE UR: ABNORMAL
BACTERIA URNS QL MICRO: NEGATIVE /HPF
BARBITURATES UR QL SCN: NEGATIVE
BENZODIAZ UR QL: NEGATIVE
BILIRUB UR QL: NEGATIVE
C TRACH RRNA SPEC QL NAA+PROBE: NEGATIVE
CANNABINOIDS UR QL SCN: POSITIVE
COCAINE UR QL SCN: NEGATIVE
COLOR UR: ABNORMAL
EPITH CASTS URNS QL MICRO: ABNORMAL /LPF
ERYTHROCYTE [DISTWIDTH] IN BLOOD BY AUTOMATED COUNT: 13 % (ref 11.5–14.5)
GLUCOSE UR STRIP.AUTO-MCNC: NEGATIVE MG/DL
HCT VFR BLD AUTO: 27 % (ref 35–47)
HGB BLD-MCNC: 8.5 G/DL (ref 11.5–16)
HGB UR QL STRIP: NEGATIVE
KETONES UR QL STRIP.AUTO: NEGATIVE MG/DL
LEUKOCYTE ESTERASE UR QL STRIP.AUTO: ABNORMAL
Lab: ABNORMAL
MCH RBC QN AUTO: 26.7 PG (ref 26–34)
MCHC RBC AUTO-ENTMCNC: 31.5 G/DL (ref 30–36.5)
MCV RBC AUTO: 84.9 FL (ref 80–99)
METHADONE UR QL: NEGATIVE
MUCOUS THREADS URNS QL MICRO: ABNORMAL /LPF
N GONORRHOEA RRNA SPEC QL NAA+PROBE: NEGATIVE
NITRITE UR QL STRIP.AUTO: NEGATIVE
NRBC # BLD: 0 K/UL (ref 0–0.01)
NRBC BLD-RTO: 0 PER 100 WBC
OPIATES UR QL: NEGATIVE
PCP UR QL: NEGATIVE
PH UR STRIP: 6 (ref 5–8)
PLATELET # BLD AUTO: 196 K/UL (ref 150–400)
PMV BLD AUTO: 10.8 FL (ref 8.9–12.9)
PROT UR STRIP-MCNC: NEGATIVE MG/DL
RBC # BLD AUTO: 3.18 M/UL (ref 3.8–5.2)
RBC #/AREA URNS HPF: ABNORMAL /HPF (ref 0–5)
RPR SER QL: NONREACTIVE
SP GR UR REFRACTOMETRY: 1.01 (ref 1–1.03)
T VAGINALIS RRNA SPEC QL NAA+PROBE: POSITIVE
URINE CULTURE IF INDICATED: ABNORMAL
UROBILINOGEN UR QL STRIP.AUTO: 0.1 EU/DL (ref 0.1–1)
WBC # BLD AUTO: 9.9 K/UL (ref 3.6–11)
WBC URNS QL MICRO: ABNORMAL /HPF (ref 0–4)

## 2025-02-12 PROCEDURE — 6370000000 HC RX 637 (ALT 250 FOR IP): Performed by: OBSTETRICS & GYNECOLOGY

## 2025-02-12 PROCEDURE — 6360000002 HC RX W HCPCS: Performed by: ANESTHESIOLOGY

## 2025-02-12 PROCEDURE — 86901 BLOOD TYPING SEROLOGIC RH(D): CPT

## 2025-02-12 PROCEDURE — 1120000000 HC RM PRIVATE OB

## 2025-02-12 PROCEDURE — 86923 COMPATIBILITY TEST ELECTRIC: CPT

## 2025-02-12 PROCEDURE — 86592 SYPHILIS TEST NON-TREP QUAL: CPT

## 2025-02-12 PROCEDURE — 2500000003 HC RX 250 WO HCPCS: Performed by: OBSTETRICS & GYNECOLOGY

## 2025-02-12 PROCEDURE — 81001 URINALYSIS AUTO W/SCOPE: CPT

## 2025-02-12 PROCEDURE — 7100000000 HC PACU RECOVERY - FIRST 15 MIN: Performed by: OBSTETRICS & GYNECOLOGY

## 2025-02-12 PROCEDURE — 3700000000 HC ANESTHESIA ATTENDED CARE: Performed by: OBSTETRICS & GYNECOLOGY

## 2025-02-12 PROCEDURE — 36415 COLL VENOUS BLD VENIPUNCTURE: CPT

## 2025-02-12 PROCEDURE — 2580000003 HC RX 258: Performed by: OBSTETRICS & GYNECOLOGY

## 2025-02-12 PROCEDURE — 6360000002 HC RX W HCPCS: Performed by: OBSTETRICS & GYNECOLOGY

## 2025-02-12 PROCEDURE — 86850 RBC ANTIBODY SCREEN: CPT

## 2025-02-12 PROCEDURE — 3700000001 HC ADD 15 MINUTES (ANESTHESIA): Performed by: OBSTETRICS & GYNECOLOGY

## 2025-02-12 PROCEDURE — 6360000002 HC RX W HCPCS: Performed by: NURSE ANESTHETIST, CERTIFIED REGISTERED

## 2025-02-12 PROCEDURE — 85027 COMPLETE CBC AUTOMATED: CPT

## 2025-02-12 PROCEDURE — 7100000001 HC PACU RECOVERY - ADDTL 15 MIN: Performed by: OBSTETRICS & GYNECOLOGY

## 2025-02-12 PROCEDURE — 2580000003 HC RX 258: Performed by: NURSE ANESTHETIST, CERTIFIED REGISTERED

## 2025-02-12 PROCEDURE — 3609079900 HC CESAREAN SECTION: Performed by: OBSTETRICS & GYNECOLOGY

## 2025-02-12 PROCEDURE — 86900 BLOOD TYPING SEROLOGIC ABO: CPT

## 2025-02-12 PROCEDURE — 87086 URINE CULTURE/COLONY COUNT: CPT

## 2025-02-12 PROCEDURE — 59515 CESAREAN DELIVERY: CPT | Performed by: OBSTETRICS & GYNECOLOGY

## 2025-02-12 PROCEDURE — 2709999900 HC NON-CHARGEABLE SUPPLY: Performed by: OBSTETRICS & GYNECOLOGY

## 2025-02-12 PROCEDURE — 80307 DRUG TEST PRSMV CHEM ANLYZR: CPT

## 2025-02-12 RX ORDER — SODIUM CHLORIDE 0.9 % (FLUSH) 0.9 %
5-40 SYRINGE (ML) INJECTION PRN
Status: DISCONTINUED | OUTPATIENT
Start: 2025-02-12 | End: 2025-02-15 | Stop reason: HOSPADM

## 2025-02-12 RX ORDER — METRONIDAZOLE 500 MG/1
500 TABLET ORAL 2 TIMES DAILY
Qty: 10 TABLET | Refills: 0 | Status: SHIPPED | OUTPATIENT
Start: 2025-02-12 | End: 2025-02-17

## 2025-02-12 RX ORDER — MORPHINE SULFATE 2 MG/ML
2 INJECTION, SOLUTION INTRAMUSCULAR; INTRAVENOUS
Status: DISCONTINUED | OUTPATIENT
Start: 2025-02-12 | End: 2025-02-15 | Stop reason: HOSPADM

## 2025-02-12 RX ORDER — DIPHENHYDRAMINE HYDROCHLORIDE 50 MG/ML
25 INJECTION INTRAMUSCULAR; INTRAVENOUS EVERY 6 HOURS PRN
Status: DISCONTINUED | OUTPATIENT
Start: 2025-02-12 | End: 2025-02-15 | Stop reason: HOSPADM

## 2025-02-12 RX ORDER — FENTANYL CITRATE 50 UG/ML
INJECTION, SOLUTION INTRAMUSCULAR; INTRAVENOUS
Status: DISCONTINUED | OUTPATIENT
Start: 2025-02-12 | End: 2025-02-12 | Stop reason: SDUPTHER

## 2025-02-12 RX ORDER — SODIUM CHLORIDE 0.9 % (FLUSH) 0.9 %
5-40 SYRINGE (ML) INJECTION EVERY 12 HOURS SCHEDULED
Status: DISCONTINUED | OUTPATIENT
Start: 2025-02-12 | End: 2025-02-15 | Stop reason: HOSPADM

## 2025-02-12 RX ORDER — ONDANSETRON 2 MG/ML
4 INJECTION INTRAMUSCULAR; INTRAVENOUS EVERY 6 HOURS PRN
Status: DISCONTINUED | OUTPATIENT
Start: 2025-02-12 | End: 2025-02-15 | Stop reason: HOSPADM

## 2025-02-12 RX ORDER — SODIUM CHLORIDE, SODIUM LACTATE, POTASSIUM CHLORIDE, AND CALCIUM CHLORIDE .6; .31; .03; .02 G/100ML; G/100ML; G/100ML; G/100ML
1000 INJECTION, SOLUTION INTRAVENOUS ONCE
Status: COMPLETED | OUTPATIENT
Start: 2025-02-12 | End: 2025-02-12

## 2025-02-12 RX ORDER — KETOROLAC TROMETHAMINE 30 MG/ML
30 INJECTION, SOLUTION INTRAMUSCULAR; INTRAVENOUS EVERY 6 HOURS
Status: COMPLETED | OUTPATIENT
Start: 2025-02-12 | End: 2025-02-13

## 2025-02-12 RX ORDER — OXYCODONE HYDROCHLORIDE 10 MG/1
10 TABLET ORAL EVERY 4 HOURS PRN
Status: DISCONTINUED | OUTPATIENT
Start: 2025-02-12 | End: 2025-02-15 | Stop reason: HOSPADM

## 2025-02-12 RX ORDER — MORPHINE SULFATE 4 MG/ML
4 INJECTION, SOLUTION INTRAMUSCULAR; INTRAVENOUS
Status: DISCONTINUED | OUTPATIENT
Start: 2025-02-12 | End: 2025-02-15 | Stop reason: HOSPADM

## 2025-02-12 RX ORDER — DOCUSATE SODIUM 100 MG/1
100 CAPSULE, LIQUID FILLED ORAL 2 TIMES DAILY
Qty: 60 CAPSULE | Refills: 0 | Status: SHIPPED | OUTPATIENT
Start: 2025-02-12 | End: 2025-03-14

## 2025-02-12 RX ORDER — ONDANSETRON 4 MG/1
4 TABLET, ORALLY DISINTEGRATING ORAL EVERY 8 HOURS PRN
Status: DISCONTINUED | OUTPATIENT
Start: 2025-02-12 | End: 2025-02-15 | Stop reason: HOSPADM

## 2025-02-12 RX ORDER — IBUPROFEN 800 MG/1
800 TABLET, FILM COATED ORAL EVERY 8 HOURS
Status: DISCONTINUED | OUTPATIENT
Start: 2025-02-13 | End: 2025-02-15 | Stop reason: HOSPADM

## 2025-02-12 RX ORDER — MODIFIED LANOLIN
OINTMENT (GRAM) TOPICAL
Status: DISCONTINUED | OUTPATIENT
Start: 2025-02-12 | End: 2025-02-15 | Stop reason: HOSPADM

## 2025-02-12 RX ORDER — SODIUM CHLORIDE 0.9 % (FLUSH) 0.9 %
5-40 SYRINGE (ML) INJECTION EVERY 12 HOURS SCHEDULED
Status: DISCONTINUED | OUTPATIENT
Start: 2025-02-12 | End: 2025-02-12

## 2025-02-12 RX ORDER — METRONIDAZOLE 500 MG/1
500 TABLET ORAL EVERY 12 HOURS SCHEDULED
Status: DISCONTINUED | OUTPATIENT
Start: 2025-02-12 | End: 2025-02-15 | Stop reason: HOSPADM

## 2025-02-12 RX ORDER — SODIUM CHLORIDE, SODIUM LACTATE, POTASSIUM CHLORIDE, CALCIUM CHLORIDE 600; 310; 30; 20 MG/100ML; MG/100ML; MG/100ML; MG/100ML
INJECTION, SOLUTION INTRAVENOUS CONTINUOUS
Status: DISCONTINUED | OUTPATIENT
Start: 2025-02-12 | End: 2025-02-15 | Stop reason: HOSPADM

## 2025-02-12 RX ORDER — DOCUSATE SODIUM 100 MG/1
100 CAPSULE, LIQUID FILLED ORAL 2 TIMES DAILY
Status: DISCONTINUED | OUTPATIENT
Start: 2025-02-12 | End: 2025-02-15 | Stop reason: HOSPADM

## 2025-02-12 RX ORDER — KETOROLAC TROMETHAMINE 30 MG/ML
INJECTION, SOLUTION INTRAMUSCULAR; INTRAVENOUS
Status: DISCONTINUED | OUTPATIENT
Start: 2025-02-12 | End: 2025-02-12 | Stop reason: SDUPTHER

## 2025-02-12 RX ORDER — SODIUM CHLORIDE, SODIUM LACTATE, POTASSIUM CHLORIDE, CALCIUM CHLORIDE 600; 310; 30; 20 MG/100ML; MG/100ML; MG/100ML; MG/100ML
INJECTION, SOLUTION INTRAVENOUS
Status: DISCONTINUED | OUTPATIENT
Start: 2025-02-12 | End: 2025-02-12 | Stop reason: SDUPTHER

## 2025-02-12 RX ORDER — BUPIVACAINE HYDROCHLORIDE 7.5 MG/ML
INJECTION, SOLUTION INTRASPINAL
Status: COMPLETED | OUTPATIENT
Start: 2025-02-12 | End: 2025-02-12

## 2025-02-12 RX ORDER — DIPHENHYDRAMINE HCL 25 MG
25 CAPSULE ORAL EVERY 6 HOURS PRN
Status: DISCONTINUED | OUTPATIENT
Start: 2025-02-12 | End: 2025-02-15 | Stop reason: HOSPADM

## 2025-02-12 RX ORDER — OXYCODONE AND ACETAMINOPHEN 5; 325 MG/1; MG/1
1 TABLET ORAL EVERY 6 HOURS PRN
Qty: 12 TABLET | Refills: 0 | Status: SHIPPED | OUTPATIENT
Start: 2025-02-12 | End: 2025-02-15

## 2025-02-12 RX ORDER — MORPHINE SULFATE 0.5 MG/ML
INJECTION, SOLUTION EPIDURAL; INTRATHECAL; INTRAVENOUS
Status: COMPLETED | OUTPATIENT
Start: 2025-02-12 | End: 2025-02-12

## 2025-02-12 RX ORDER — ACETAMINOPHEN 500 MG
1000 TABLET ORAL EVERY 8 HOURS SCHEDULED
Status: DISCONTINUED | OUTPATIENT
Start: 2025-02-12 | End: 2025-02-15 | Stop reason: HOSPADM

## 2025-02-12 RX ORDER — CEFAZOLIN SODIUM 1 G/3ML
INJECTION, POWDER, FOR SOLUTION INTRAMUSCULAR; INTRAVENOUS
Status: DISCONTINUED | OUTPATIENT
Start: 2025-02-12 | End: 2025-02-12 | Stop reason: SDUPTHER

## 2025-02-12 RX ORDER — OXYCODONE HYDROCHLORIDE 5 MG/1
5 TABLET ORAL EVERY 4 HOURS PRN
Status: DISCONTINUED | OUTPATIENT
Start: 2025-02-12 | End: 2025-02-15 | Stop reason: HOSPADM

## 2025-02-12 RX ORDER — SODIUM CHLORIDE 0.9 % (FLUSH) 0.9 %
10 SYRINGE (ML) INJECTION PRN
Status: DISCONTINUED | OUTPATIENT
Start: 2025-02-12 | End: 2025-02-12

## 2025-02-12 RX ORDER — SODIUM CHLORIDE 9 MG/ML
INJECTION, SOLUTION INTRAVENOUS PRN
Status: DISCONTINUED | OUTPATIENT
Start: 2025-02-12 | End: 2025-02-12

## 2025-02-12 RX ORDER — PROPOFOL 10 MG/ML
INJECTION, EMULSION INTRAVENOUS
Status: DISCONTINUED | OUTPATIENT
Start: 2025-02-12 | End: 2025-02-12 | Stop reason: SDUPTHER

## 2025-02-12 RX ORDER — SODIUM CHLORIDE 9 MG/ML
INJECTION, SOLUTION INTRAVENOUS PRN
Status: DISCONTINUED | OUTPATIENT
Start: 2025-02-12 | End: 2025-02-15 | Stop reason: HOSPADM

## 2025-02-12 RX ORDER — IBUPROFEN 800 MG/1
800 TABLET, FILM COATED ORAL EVERY 8 HOURS PRN
Qty: 30 TABLET | Refills: 0 | Status: SHIPPED | OUTPATIENT
Start: 2025-02-12 | End: 2025-02-22

## 2025-02-12 RX ADMIN — PROPOFOL 50 MG: 10 INJECTION, EMULSION INTRAVENOUS at 11:12

## 2025-02-12 RX ADMIN — FENTANYL CITRATE 100 MCG: 50 INJECTION INTRAMUSCULAR; INTRAVENOUS at 12:13

## 2025-02-12 RX ADMIN — KETOROLAC TROMETHAMINE 30 MG: 30 INJECTION, SOLUTION INTRAMUSCULAR at 11:40

## 2025-02-12 RX ADMIN — PROPOFOL 50 MG: 10 INJECTION, EMULSION INTRAVENOUS at 11:17

## 2025-02-12 RX ADMIN — SODIUM CHLORIDE, POTASSIUM CHLORIDE, SODIUM LACTATE AND CALCIUM CHLORIDE 1000 ML: 600; 310; 30; 20 INJECTION, SOLUTION INTRAVENOUS at 06:01

## 2025-02-12 RX ADMIN — CEFAZOLIN 2000 MG: 1 INJECTION, POWDER, FOR SOLUTION INTRAMUSCULAR; INTRAVENOUS at 11:00

## 2025-02-12 RX ADMIN — SODIUM CHLORIDE, POTASSIUM CHLORIDE, SODIUM LACTATE AND CALCIUM CHLORIDE: 600; 310; 30; 20 INJECTION, SOLUTION INTRAVENOUS at 07:00

## 2025-02-12 RX ADMIN — KETOROLAC TROMETHAMINE 30 MG: 30 INJECTION, SOLUTION INTRAMUSCULAR at 18:22

## 2025-02-12 RX ADMIN — WATER 1000 MG: 1 INJECTION INTRAMUSCULAR; INTRAVENOUS; SUBCUTANEOUS at 19:48

## 2025-02-12 RX ADMIN — SODIUM CHLORIDE, PRESERVATIVE FREE 20 MG: 5 INJECTION INTRAVENOUS at 06:32

## 2025-02-12 RX ADMIN — SODIUM CHLORIDE, POTASSIUM CHLORIDE, SODIUM LACTATE AND CALCIUM CHLORIDE: 600; 310; 30; 20 INJECTION, SOLUTION INTRAVENOUS at 13:19

## 2025-02-12 RX ADMIN — DIPHENHYDRAMINE HYDROCHLORIDE 25 MG: 25 CAPSULE ORAL at 13:26

## 2025-02-12 RX ADMIN — MORPHINE SULFATE 4.8 MG: 0.5 INJECTION, SOLUTION EPIDURAL; INTRATHECAL; INTRAVENOUS at 11:40

## 2025-02-12 RX ADMIN — Medication 87.3 MILLI-UNITS/MIN: at 13:13

## 2025-02-12 RX ADMIN — BUPIVACAINE HYDROCHLORIDE 12 MG: 7.5 INJECTION, SOLUTION INTRASPINAL at 10:50

## 2025-02-12 RX ADMIN — METRONIDAZOLE 500 MG: 500 TABLET ORAL at 21:18

## 2025-02-12 RX ADMIN — CEFAZOLIN 2 G: 1 INJECTION, POWDER, FOR SOLUTION INTRAMUSCULAR; INTRAVENOUS at 11:12

## 2025-02-12 RX ADMIN — SODIUM CHLORIDE, POTASSIUM CHLORIDE, SODIUM LACTATE AND CALCIUM CHLORIDE: 600; 310; 30; 20 INJECTION, SOLUTION INTRAVENOUS at 10:41

## 2025-02-12 RX ADMIN — ACETAMINOPHEN 1000 MG: 500 TABLET, FILM COATED ORAL at 22:42

## 2025-02-12 RX ADMIN — DOCUSATE SODIUM 100 MG: 100 CAPSULE, LIQUID FILLED ORAL at 21:18

## 2025-02-12 RX ADMIN — ACETAMINOPHEN 1000 MG: 500 TABLET, FILM COATED ORAL at 14:18

## 2025-02-12 RX ADMIN — MORPHINE SULFATE 0.2 MG: 0.5 INJECTION, SOLUTION EPIDURAL; INTRATHECAL; INTRAVENOUS at 10:50

## 2025-02-12 ASSESSMENT — PAIN DESCRIPTION - LOCATION: LOCATION: ABDOMEN

## 2025-02-12 ASSESSMENT — PAIN SCALES - GENERAL
PAINLEVEL_OUTOF10: 0

## 2025-02-12 ASSESSMENT — LIFESTYLE VARIABLES: SMOKING_STATUS: 1

## 2025-02-12 NOTE — ANESTHESIA PRE PROCEDURE
Department of Anesthesiology  Preprocedure Note       Name:  Morteza Montgomery   Age:  24 y.o.  :  2000                                          MRN:  [id number]         Date:  2025      Surgeon: Surgeon(s):  Hema Delgado MD    Procedure: Procedure(s):   SECTION    Medications prior to admission:   Prior to Admission medications    Medication Sig Start Date End Date Taking? Authorizing Provider   ferrous sulfate (IRON 325) 325 (65 Fe) MG tablet Take 1 tablet by mouth 2 times daily  Patient not taking: Reported on 2025   Hema Delgado MD   aspirin 81 MG EC tablet Take 1 tablet by mouth daily  Patient not taking: Reported on 2025 8/15/24   Hema Delgado MD   metoclopramide (REGLAN) 5 MG tablet Take 1 tablet by mouth 4 times daily  Patient not taking: Reported on 2025   Israel Pak PA-C       Current medications:    Current Facility-Administered Medications   Medication Dose Route Frequency Provider Last Rate Last Admin   • lactated ringers infusion   IntraVENous Continuous Hema Delgado  mL/hr at 25 0701 Rate Verify at 25 0701   • sodium chloride flush 0.9 % injection 5-40 mL  5-40 mL IntraVENous 2 times per day Hema Delgado MD       • sodium chloride flush 0.9 % injection 10 mL  10 mL IntraVENous PRN Hema Delgado MD       • 0.9 % sodium chloride infusion   IntraVENous PRN Hema Delgado MD       • oxytocin (PITOCIN) 30 units in 500 mL infusion  87.3 molly-units/min IntraVENous Continuous PRN Hema Delgado MD        And   • oxytocin (PITOCIN) 10 unit bolus from the bag  10 Units IntraVENous PRN Hema Delgado MD       • ondansetron (ZOFRAN) injection 4 mg  4 mg IntraVENous Q6H PRN Hema Delgado MD       • ceFAZolin (ANCEF) 2,000 mg in sterile water 20 mL IV syringe  2,000 mg IntraVENous Once Hema Delgado MD

## 2025-02-12 NOTE — PROGRESS NOTES
0455  Pt arrived ambulatory to L&D for scheduled repeat C/S.  Consents obtained and pt to BR to void and change into [facility] gown.    0608  Abdomen cleansed with CHG wipes.  Pt has previously shaved pubic area.    0640  Pt educated on the importance of TCDB post surgery to help prevent complications.  Pt verbalized understanding.  Writer also reminded pt of visitation policy:  1 adult visito may stay overnight.  Pt verbalized understanding.

## 2025-02-12 NOTE — ANESTHESIA PROCEDURE NOTES
Spinal Block    End time: 2/12/2025 3:08 PM  Reason for block: primary anesthetic  Staffing  Performed: resident/CRNA   Resident/CRNA: Shantelle Tan APRN - ALFREDO  Performed by: Shantelle Tan APRN - CRNA  Authorized by: Mabel Mujica MD    Spinal Block  Patient position: sitting  Prep: Betadine  Patient monitoring: continuous pulse ox, continuous capnometry and frequent blood pressure checks  Approach: midline  Location: L4/L5  Provider prep: mask and sterile gloves  Local infiltration: lidocaine  Needle  Needle type: Quincke   Needle gauge: 22 G  Needle length: 3.5 in  Assessment  Swirl obtained: Yes  CSF: clear  Attempts: 2  Hemodynamics: stable  Preanesthetic Checklist  Completed: patient identified, IV checked, site marked, risks and benefits discussed, surgical/procedural consents, equipment checked, pre-op evaluation, timeout performed, anesthesia consent given, oxygen available, monitors applied/VS acknowledged, fire risk safety assessment completed and verbalized and blood product R/B/A discussed and consented

## 2025-02-12 NOTE — PROCEDURES
PROCEDURE NOTE  Date: 2025   Name: Morteza Montgomery  YOB: 2000    Procedures    OPERATIVE NOTE:   SECTION    PREOPERATIVE DIAGNOSIS:  Intrauterine pregnancy at 39-1/7-week gestation  History of  section  Carrier of spinal muscular atrophy  Poor patient attendance of  care  Anemia complicating pregnancy, third trimester  Trichomonal vaginitis during pregnancy in third trimester, active    POSTOPERATIVE DIAGNOSIS:   Intrauterine pregnancy at 39-1/7-week gestation  History of  section  Delivery  via  section  Carrier of spinal muscular atrophy  Poor patient attendance of  care  Anemia complicating pregnancy, third trimester  Trichomonal vaginitis during pregnancy in third trimester, active    PROCEDURE: Low transverse  section    ANESTHESIA: Spinal    ANESTHESIOLOGIST: Shantelle Tan APRN - CRNA     SURGEON: Hema Diego M.D.    ASSISTANT: N/A    COMPLICATIONS: None    CONDITION DURING SURGERY: Stable    ESTIMATED BLOOD LOSS: 600 mL    SURGICAL COUNTS: Correct x 3    SPECIMEN: None    FINDINGS: Gravid uterus, normal ovaries and fallopian tubes.  Baby: Female.  Presentation: Vertex.  Weight: 6 pounds 1.5 ounces.  Apgar: 8, 9.    DESCRIPTION OF PROCEDURE: After risks of the procedure were explained to the patient, she was taken to the operating room. After adequate anesthesia, the patient was placed in dorsal supine position.  Rodriguez catheter in place.  A timeout was taken.  All were in agreement.  The patient had ALEKS hose and pneumoboots in place and functioning.  She was prepped and draped in the usual sterile fashion.  The abdomen was opened through a Pfannenstiel incision.  The incision was then extended to the level of the fascia with the electrocautery.  The fascial layer was scored bilaterally in the midline and extended.  The fascial layer was resected from the muscular layer.  The muscular layer was dissected

## 2025-02-12 NOTE — PLAN OF CARE
Problem: Pain  Goal: Verbalizes/displays adequate comfort level or baseline comfort level  2025 09 by Anahi Ayala RN  Outcome: Progressing  Flowsheets (Taken 2025 0740)  Verbalizes/displays adequate comfort level or baseline comfort level:   Encourage patient to monitor pain and request assistance   Assess pain using appropriate pain scale   Administer analgesics based on type and severity of pain and evaluate response   Implement non-pharmacological measures as appropriate and evaluate response   Consider cultural and social influences on pain and pain management   Notify Licensed Independent Practitioner if interventions unsuccessful or patient reports new pain  2025 05 by Joseline Kamara RN  Outcome: Progressing     Problem: ABCDS Injury Assessment  Goal: Absence of physical injury  2025 by Anahi Ayala RN  Outcome: Progressing  2025 by Joseline Kamara RN  Outcome: Progressing     Problem: Postpartum  Goal: Experiences normal postpartum course  Description:  Postpartum OB-Pregnancy care plan goal which identifies if the mother is experiencing a normal postpartum course  2025 154 by Ryan Mabry RN  Outcome: Progressing  2025 by Anahi Ayala RN  Outcome: Progressing  2025 by Joseline Kamara RN  Outcome: Progressing  Goal: Appropriate maternal -  bonding  Description:  Postpartum OB-Pregnancy care plan goal which identifies if the mother and  are bonding appropriately  2025 by Ryan Mabry RN  Outcome: Progressing  2025 by Anahi Ayala RN  Outcome: Progressing  2025 by Joseline Kamara RN  Outcome: Progressing  Goal: Establishment of infant feeding pattern  Description:  Postpartum OB-Pregnancy care plan goal which identifies if the mother is establishing a feeding pattern with their   2025 by Ryan Mabry RN  Outcome:

## 2025-02-12 NOTE — PROGRESS NOTES
Spiritual Health History and Assessment/Progress Note  [facility]    Spiritual/Emotional Needs,  ,  ,      Name: Morteza Montgomery MRN: [id number]    Age: 24 y.o.     Sex: female   Language: English   Congregational: None   39 weeks gestation of pregnancy     Date: 2/12/2025            Total Time Calculated: 32 min              Spiritual Assessment began in [facility] 3 LABOR & DELIVERY        Referral/Consult From: Nurse   Encounter Overview/Reason: Spiritual/Emotional Needs  Service Provided For: Patient    Milagros, Belief, Meaning:   Patient identifies as spiritual, is connected with a milagros tradition or spiritual practice, and has beliefs or practices that help with coping during difficult times  Family/Friends No family/friends present      Importance and Influence:  Patient has spiritual/personal beliefs that influence decisions regarding their health  Family/Friends No family/friends present    Community:  Patient is connected with a spiritual community and feels well-supported. Support system includes: Parent/s, Milagros Community, and Extended family  Family/Friends No family/friends present    Assessment and Plan of Care:     Patient Interventions include: Facilitated expression of thoughts and feelings, Explored spiritual coping/struggle/distress, Engaged in theological reflection, Affirmed coping skills/support systems, and Facilitated life review and/ or legacy  Family/Friends Interventions include: No family/friends present    Patient Plan of Care: Spiritual Care available upon further referral  Family/Friends Plan of Care: Spiritual Care available upon further referral     responded to consult for the patient in L&D3. Patient expressed nervousness and stress related to her surgery. She wished to have prayer beforehand. She processed her experience for the morning and requested prayer for both her surgery and her son.  engaged her in reflective listening and provided prayer. She was tearful

## 2025-02-12 NOTE — PROGRESS NOTES
0724: Received care of Ms. Montgomery resting in bed and awake. No acute distress noted. Bedside report was received from Joseline Kamara RN.     0727: Shift assessment was initiated and completed. Temp 98.2 and denied pain. POC was reviewed.     0735: Dr. Bejarano in the patient's room and informed the patient that her surgical procedure was delayed d/t to unforseen circumstances. The writer placed a call to the 's pager.     0737: The writer placed a call to the  and asked if the  could be paged for Labor/Delivery.     0843:  called to Labor/Delivery and was informed of the patient's request for prayer.  stated that he would come.     0850:  Ad Pope present and went to the patient's room.    0905:  prayed with the patient.     1031: EFM with external leads were discontinued.     1035: Patient ambulated to OR #2, accompanied by the writer and MAXIMILIAN Tan CRNA. No problems noted.     1059:  bpm, LLQ. Patient's abdomen was inspected, again. Abdominal clipping was needed and performed.     1131: Dr. Bejarano delivered a viable female infant by a repeat  section. Mouth and nose suctioned using the bulb syringe by Dr. Bejarano. Cry loud and lusty. FROM. Color pink with acrocyanosis of the hands and feet.     1132: Cord clamped and cut by Dr. Bejarano. Infant was placed in JAROCHO Prajapati RN sterile draped arms then placed in the stabilet for immediate care. CLAUDIA Palencia, RT present.     1205: Infant was shown briefly to mom per JAROCHO Prajapati RN then taken to mom's room.     1216: Patient was transported out of OR #2 in her bed and accompanied by the writer and MAXIMILIAN Tan CRNA.     1217:  Recovery period was initiated.     1218: Patient in her room. Vital signs obtained and denied pain.     1445: Ms. Montgomery was transported in her bed by the writer, accompanied by the JAROCHO baltazar RN, the FOB and the oldest child. Infant was placed in mom's arms. No problems noted.     1450: Bedside

## 2025-02-12 NOTE — PLAN OF CARE
Problem: Postpartum  Goal: Experiences normal postpartum course  Description:  Postpartum OB-Pregnancy care plan goal which identifies if the mother is experiencing a normal postpartum course  2025 by Ryan Mabry RN  Outcome: Progressing  2025 by Anahi Ayala RN  Outcome: Progressing  2025 05 by Joseline Kamara RN  Outcome: Progressing     Problem: Postpartum  Goal: Appropriate maternal -  bonding  Description:  Postpartum OB-Pregnancy care plan goal which identifies if the mother and  are bonding appropriately  2025 by Ryan Mabry RN  Outcome: Progressing  2025 by Anahi Ayala RN  Outcome: Progressing  2025 by Joseline Kamara RN  Outcome: Progressing     Problem: Postpartum  Goal: Establishment of infant feeding pattern  Description:  Postpartum OB-Pregnancy care plan goal which identifies if the mother is establishing a feeding pattern with their   2025 by Ryan Mabry RN  Outcome: Progressing  2025 by Anahi Ayala RN  Outcome: Progressing  2025 by Joseline Kamara RN  Outcome: Progressing     Problem: Postpartum  Goal: Incisions, wounds, or drain sites healing without S/S of infection  2025 by Ryan Mabry RN  Outcome: Progressing  2025 by Anahi Ayala RN  Outcome: Progressing  Flowsheets (Taken 2025 07)  Incisions, Wounds, or Drain Sites Healing Without Sign and Symptoms of Infection: TWICE DAILY: Assess and document skin integrity  2025 05 by Joseline Kamara RN  Outcome: Progressing     Problem: Safety - Adult  Goal: Free from fall injury  2025 by Ryan Mabry RN  Outcome: Progressing  2025 by Anahi Ayala RN  Outcome: Progressing  Flowsheets (Taken 2025 07)  Free From Fall Injury:   Instruct family/caregiver on patient safety   Based on

## 2025-02-12 NOTE — H&P
OB ADMISSION NOTE    CHIEF COMPLAINT/HISTORY OF PRESENT ILLNESS: The patient is a 24 year-old with an IUP of 39-1/7 weeks who is a G: 3 P: 1011 with an MASHA of 2025.  She is blood type and group O Pos, rubella immune and her GBS status is negative.  She has history of previous  section.  She is admitted today for an elective, repeat  section.  Pregnancy complicated by anemia, carrier of spinal muscular atrophy and limited prenatal care. Denies leak of neither water nor bleeding.  She did have fetal movements.              No Known Allergies       Current Facility-Administered Medications:     lactated ringers infusion, , IntraVENous, Continuous, Hema Delgado MD    lactated ringers bolus 1,000 mL, 1,000 mL, IntraVENous, Once, Hema Delgado MD, Last Rate: 1,000 mL/hr at 25 06, 1,000 mL at 25 06    sodium chloride flush 0.9 % injection 5-40 mL, 5-40 mL, IntraVENous, 2 times per day, Hema Delgado MD    sodium chloride flush 0.9 % injection 10 mL, 10 mL, IntraVENous, PRN, Hema Delgado MD    0.9 % sodium chloride infusion, , IntraVENous, PRN, Hema Delgado MD    oxytocin (PITOCIN) 30 units in 500 mL infusion, 87.3 molly-units/min, IntraVENous, Continuous PRN **AND** oxytocin (PITOCIN) 10 unit bolus from the bag, 10 Units, IntraVENous, PRN, Hema Delgado MD    ondansetron (ZOFRAN) injection 4 mg, 4 mg, IntraVENous, Q6H PRN, Hema Delgado MD    ceFAZolin (ANCEF) 2,000 mg in sterile water 20 mL IV syringe, 2,000 mg, IntraVENous, Once, Hema Delgado MD     Past Medical History:   Diagnosis Date    Hypertension     h/o preeclampsia with 1st pregnancy    Right wrist injury         Past Surgical History:   Procedure Laterality Date     SECTION      MOUTH SURGERY Bilateral     adjusted right upper and left lower tooth and fixed chain to braces (no  80.0 - 99.0 FL    MCH 26.7 26.0 - 34.0 PG    MCHC 31.5 30.0 - 36.5 g/dL    RDW 13.0 11.5 - 14.5 %    Platelets 196 150 - 400 K/uL    MPV 10.8 8.9 - 12.9 FL    Nucleated RBCs 0.0 0.0  WBC    nRBC 0.00 0.00 - 0.01 K/uL        ASSESSMENT:  Patient Active Problem List   Diagnosis    History of  section    Carrier of spinal muscular atrophy    Poor patient attendance of  care    39-1/7 weeks gestation of pregnancy    Anemia complicating pregnancy, third trimester      Plan:   The risk, benefits, complications, alternative treatment options, and expected outcomes were discussed with the patient.  Patient was given an opportunity to ask questions, all questions were answered, patient voiced understanding of above.  Informed consent was obtained.    Admit to [facility], continue fetal monitoring, please see admit orders.

## 2025-02-12 NOTE — ANESTHESIA POSTPROCEDURE EVALUATION
Department of Anesthesiology  Postprocedure Note    Patient: Morteza Montgomery  MRN: [id number]  YOB: 2000  Date of evaluation: 2025    Procedure Summary       Date: 25 Room / Location: [facility] L&D OR    Anesthesia Start: 1041 Anesthesia Stop: 1221    Procedure:  SECTION (Abdomen) Diagnosis:       Delivery by elective  section      (Delivery by elective  section [O82])    Surgeons: Hema Delgado MD Responsible Provider: Mabel Mujica MD    Anesthesia Type: Spinal ASA Status: 3            Anesthesia Type: Spinal    Latha Phase I: Latha Score: 9    Latha Phase II:      Anesthesia Post Evaluation    Patient location during evaluation: PACU  Patient participation: complete - patient participated  Level of consciousness: awake  Airway patency: patent  Nausea & Vomiting: no nausea and no vomiting  Cardiovascular status: hemodynamically stable  Respiratory status: acceptable  Hydration status: euvolemic  Pain management: adequate    No notable events documented.

## 2025-02-12 NOTE — PLAN OF CARE
Problem: Pain  Goal: Verbalizes/displays adequate comfort level or baseline comfort level  2025 by Anahi Ayala RN  Outcome: Progressing  Flowsheets (Taken 2025)  Verbalizes/displays adequate comfort level or baseline comfort level:   Encourage patient to monitor pain and request assistance   Assess pain using appropriate pain scale   Administer analgesics based on type and severity of pain and evaluate response   Implement non-pharmacological measures as appropriate and evaluate response   Consider cultural and social influences on pain and pain management   Notify Licensed Independent Practitioner if interventions unsuccessful or patient reports new pain  2025 by Joseline Kamara RN  Outcome: Progressing     Problem: ABCDS Injury Assessment  Goal: Absence of physical injury  2025 by Anahi Ayala RN  Outcome: Progressing  2025 by Joseline Kamara RN  Outcome: Progressing     Problem: Vaginal Birth or  Section  Goal: Fetal and maternal status remain reassuring during the birth process  Description:  Birth OB-Pregnancy care plan goal which identifies if the fetal and maternal status remain reassuring during the birth process  2025 by Anahi Ayala RN  Outcome: Progressing  Flowsheets (Taken 2025)  Fetal and Maternal Status Remain Reassuring During the Birth Process:   Monitor vital signs   Monitor fetal heart rate   Monitor uterine activity   Monitor labor progression (Vaginal delivery)   Deep vein thrombosis prophylaxis ( delivery)   Surgical antibiotic prophylaxis ( delivery)  2025 by Joseline Kamara RN  Outcome: Progressing     Problem: Postpartum  Goal: Experiences normal postpartum course  Description:  Postpartum OB-Pregnancy care plan goal which identifies if the mother is experiencing a normal postpartum course  2025 by Anahi Ayala RN  Outcome: Progressing  2025 by Joseline Kamara

## 2025-02-13 LAB
BACTERIA SPEC CULT: NORMAL
COLONY COUNT, CNT: NORMAL
COLONY COUNT, CNT: NORMAL
ERYTHROCYTE [DISTWIDTH] IN BLOOD BY AUTOMATED COUNT: 13.1 % (ref 11.5–14.5)
HCT VFR BLD AUTO: 20.6 % (ref 35–47)
HGB BLD-MCNC: 6.7 G/DL (ref 11.5–16)
Lab: NORMAL
MCH RBC QN AUTO: 27.8 PG (ref 26–34)
MCHC RBC AUTO-ENTMCNC: 32.5 G/DL (ref 30–36.5)
MCV RBC AUTO: 85.5 FL (ref 80–99)
NRBC # BLD: 0 K/UL (ref 0–0.01)
NRBC BLD-RTO: 0 PER 100 WBC
PLATELET # BLD AUTO: 161 K/UL (ref 150–400)
PMV BLD AUTO: 11.4 FL (ref 8.9–12.9)
RBC # BLD AUTO: 2.41 M/UL (ref 3.8–5.2)
WBC # BLD AUTO: 15 K/UL (ref 3.6–11)

## 2025-02-13 PROCEDURE — 2500000003 HC RX 250 WO HCPCS: Performed by: OBSTETRICS & GYNECOLOGY

## 2025-02-13 PROCEDURE — 36415 COLL VENOUS BLD VENIPUNCTURE: CPT

## 2025-02-13 PROCEDURE — 6360000002 HC RX W HCPCS: Performed by: OBSTETRICS & GYNECOLOGY

## 2025-02-13 PROCEDURE — 85027 COMPLETE CBC AUTOMATED: CPT

## 2025-02-13 PROCEDURE — 99024 POSTOP FOLLOW-UP VISIT: CPT | Performed by: OBSTETRICS & GYNECOLOGY

## 2025-02-13 PROCEDURE — 1120000000 HC RM PRIVATE OB

## 2025-02-13 PROCEDURE — 6370000000 HC RX 637 (ALT 250 FOR IP): Performed by: OBSTETRICS & GYNECOLOGY

## 2025-02-13 RX ORDER — SIMETHICONE 80 MG
80 TABLET,CHEWABLE ORAL EVERY 6 HOURS PRN
Status: DISCONTINUED | OUTPATIENT
Start: 2025-02-13 | End: 2025-02-15 | Stop reason: HOSPADM

## 2025-02-13 RX ADMIN — SIMETHICONE 80 MG: 80 TABLET, CHEWABLE ORAL at 22:24

## 2025-02-13 RX ADMIN — IBUPROFEN 800 MG: 800 TABLET, FILM COATED ORAL at 17:00

## 2025-02-13 RX ADMIN — METRONIDAZOLE 500 MG: 500 TABLET ORAL at 20:46

## 2025-02-13 RX ADMIN — OXYCODONE HYDROCHLORIDE 10 MG: 10 TABLET ORAL at 20:46

## 2025-02-13 RX ADMIN — OXYCODONE HYDROCHLORIDE 10 MG: 10 TABLET ORAL at 09:44

## 2025-02-13 RX ADMIN — DOCUSATE SODIUM 100 MG: 100 CAPSULE, LIQUID FILLED ORAL at 20:46

## 2025-02-13 RX ADMIN — KETOROLAC TROMETHAMINE 30 MG: 30 INJECTION, SOLUTION INTRAMUSCULAR at 00:19

## 2025-02-13 RX ADMIN — ACETAMINOPHEN 1000 MG: 500 TABLET, FILM COATED ORAL at 20:46

## 2025-02-13 RX ADMIN — METRONIDAZOLE 500 MG: 500 TABLET ORAL at 08:58

## 2025-02-13 RX ADMIN — DOCUSATE SODIUM 100 MG: 100 CAPSULE, LIQUID FILLED ORAL at 08:58

## 2025-02-13 RX ADMIN — KETOROLAC TROMETHAMINE 30 MG: 30 INJECTION, SOLUTION INTRAMUSCULAR at 12:18

## 2025-02-13 RX ADMIN — KETOROLAC TROMETHAMINE 30 MG: 30 INJECTION, SOLUTION INTRAMUSCULAR at 06:06

## 2025-02-13 RX ADMIN — WATER 1000 MG: 1 INJECTION INTRAMUSCULAR; INTRAVENOUS; SUBCUTANEOUS at 12:18

## 2025-02-13 RX ADMIN — ACETAMINOPHEN 1000 MG: 500 TABLET, FILM COATED ORAL at 06:06

## 2025-02-13 RX ADMIN — ACETAMINOPHEN 1000 MG: 500 TABLET, FILM COATED ORAL at 14:45

## 2025-02-13 RX ADMIN — WATER 1000 MG: 1 INJECTION INTRAMUSCULAR; INTRAVENOUS; SUBCUTANEOUS at 04:08

## 2025-02-13 ASSESSMENT — PAIN DESCRIPTION - DESCRIPTORS
DESCRIPTORS: ACHING;CRAMPING
DESCRIPTORS: ACHING

## 2025-02-13 ASSESSMENT — PAIN DESCRIPTION - LOCATION
LOCATION: ABDOMEN
LOCATION: ABDOMEN;INCISION

## 2025-02-13 ASSESSMENT — PAIN - FUNCTIONAL ASSESSMENT: PAIN_FUNCTIONAL_ASSESSMENT: ACTIVITIES ARE NOT PREVENTED

## 2025-02-13 ASSESSMENT — PAIN DESCRIPTION - ORIENTATION: ORIENTATION: LOWER

## 2025-02-13 ASSESSMENT — PAIN SCALES - GENERAL
PAINLEVEL_OUTOF10: 10
PAINLEVEL_OUTOF10: 7

## 2025-02-13 NOTE — PROGRESS NOTES
OB PROGRESS NOTE    PROBLEM:  Status post  section, postoperative day #1    SUBJECTIVE: Patient is doing better, has no complaints.    VITALS:  Vitals:    25 2255   BP: 131/72   Pulse: 81   Resp: 18   Temp: 98 °F (36.7 °C)   SpO2: 98%      HEART: Regular rhythm, no murmur  LUNGS: Clear to auscultation at both fields  ABDOMEN: Soft and depressible, normal bowel sounds  PELVIC: Normal lochia      LABS:  Results for orders placed or performed during the hospital encounter of 25   CBC   Result Value Ref Range    WBC 9.9 3.6 - 11.0 K/uL    RBC 3.18 (L) 3.80 - 5.20 M/uL    Hemoglobin 8.5 (L) 11.5 - 16.0 g/dL    Hematocrit 27.0 (L) 35.0 - 47.0 %    MCV 84.9 80.0 - 99.0 FL    MCH 26.7 26.0 - 34.0 PG    MCHC 31.5 30.0 - 36.5 g/dL    RDW 13.0 11.5 - 14.5 %    Platelets 196 150 - 400 K/uL    MPV 10.8 8.9 - 12.9 FL    Nucleated RBCs 0.0 0.0  WBC    nRBC 0.00 0.00 - 0.01 K/uL   DRUG SCREEN MULTI URINE   Result Value Ref Range    Amphetamine, Urine Negative Negative      Barbiturates, Urine Negative Negative      Benzodiazepines, Urine Negative Negative      Cocaine, Urine Negative Negative      Methadone, Urine Negative Negative      Opiates, Urine Negative Negative      Phencyclidine, Urine Negative Negative      THC, TH-Cannabinol, Urine Positive (A) Negative      Comments:        This test is a screen for drugs of abuse in a medical setting only (i.e., they are unconfirmed results and as such must not be used for non-medical purposes, e.g.,employment testing, legal testing). Due to its inherent nature, false positive (FP) and false negative (FN) results may be obtained. Therefore, if necessary for medical care, recommend confirmation of positive findings by GC/MS.     RPR    Result Value Ref Range    RPR NONREACTIVE NONREACTIVE   Urinalysis with Reflex to Culture    Specimen: Urine   Result Value Ref Range    Color, UA Yellow/Straw      Appearance Turbid (A) Clear      Specific [place], UA 1.013  1.003 - 1.030      pH, Urine 6.0 5.0 - 8.0      Protein, UA Negative Negative mg/dL    Glucose, Ur Negative Negative mg/dL    Ketones, Urine Negative Negative mg/dL    Bilirubin, Urine Negative Negative      Blood, Urine Negative Negative      Urobilinogen, Urine 0.1 0.1 - 1.0 EU/dL    Nitrite, Urine Negative Negative      Leukocyte Esterase, Urine Large (A) Negative      WBC, UA 20-50 0 - 4 /hpf    RBC, UA 5-10 0 - 5 /hpf    Epithelial Cells, UA Many (A) Few /lpf    BACTERIA, URINE Negative Negative /hpf    Urine Culture if Indicated Urine Culture Ordered (A) Culture not indicated by UA result      Mucus, UA Trace (A) Negative /lpf   TYPE AND SCREEN   Result Value Ref Range    Crossmatch expiration date 02/15/2025,1320     ABO/Rh O Positive     Antibody Screen Negative         ASSESSMENT: She is actually improving, hemodynamically stable, afebrile, ambulating, tolerating diet, voiding spontaneously, with adequate urine output, adequate pain control, without complications.    PLAN:  Encourage ambulation  Stool softeners as needed  Pain management

## 2025-02-13 NOTE — PLAN OF CARE
Problem: Pain  Goal: Verbalizes/displays adequate comfort level or baseline comfort level  2025 by Slime Delaney RN  Outcome: Progressing  Flowsheets (Taken 2025)  Verbalizes/displays adequate comfort level or baseline comfort level:   Encourage patient to monitor pain and request assistance   Assess pain using appropriate pain scale   Administer analgesics based on type and severity of pain and evaluate response   Implement non-pharmacological measures as appropriate and evaluate response   Consider cultural and social influences on pain and pain management   Notify Licensed Independent Practitioner if interventions unsuccessful or patient reports new pain  2025 09 by Anahi Ayala RN  Outcome: Progressing  Flowsheets (Taken 2025)  Verbalizes/displays adequate comfort level or baseline comfort level:   Encourage patient to monitor pain and request assistance   Assess pain using appropriate pain scale   Administer analgesics based on type and severity of pain and evaluate response   Implement non-pharmacological measures as appropriate and evaluate response   Consider cultural and social influences on pain and pain management   Notify Licensed Independent Practitioner if interventions unsuccessful or patient reports new pain     Problem: ABCDS Injury Assessment  Goal: Absence of physical injury  2025 by Slime Delaney RN  Outcome: Progressing  2025 by Anahi Ayala RN  Outcome: Progressing     Problem: Postpartum  Goal: Experiences normal postpartum course  Description:  Postpartum OB-Pregnancy care plan goal which identifies if the mother is experiencing a normal postpartum course  2025 by Slime Delaney RN  Outcome: Progressing  2025 1542 by Ryan Mabry RN  Outcome: Progressing  2025 by Anahi Ayala RN  Outcome: Progressing  Goal: Appropriate maternal -  bonding  Description:  Postpartum  OB-Pregnancy care plan goal which identifies if the mother and  are bonding appropriately  2025 by Slime Delaney RN  Outcome: Progressing  2025 154 by Ryan Mabry RN  Outcome: Progressing  2025 by Anahi Ayala RN  Outcome: Progressing  Goal: Establishment of infant feeding pattern  Description:  Postpartum OB-Pregnancy care plan goal which identifies if the mother is establishing a feeding pattern with their   2025 by Slime Delaney RN  Outcome: Progressing  2025 154 by Ryan Mabry RN  Outcome: Progressing  2025 by Anahi Ayala RN  Outcome: Progressing  Goal: Incisions, wounds, or drain sites healing without S/S of infection  2025 by Slime Delaney RN  Outcome: Progressing  Flowsheets (Taken 2025)  Incisions, Wounds, or Drain Sites Healing Without Sign and Symptoms of Infection: TWICE DAILY: Assess and document skin integrity  2025 by Ryan Mabry RN  Outcome: Progressing  2025 by Anahi Ayala RN  Outcome: Progressing  Flowsheets (Taken 2025)  Incisions, Wounds, or Drain Sites Healing Without Sign and Symptoms of Infection: TWICE DAILY: Assess and document skin integrity     Problem: Infection - Adult  Goal: Absence of infection at discharge  2025 by Slime Delaney RN  Outcome: Progressing  2025 by Anahi Ayala RN  Outcome: Progressing  Goal: Absence of infection during hospitalization  2025 by Slime Delaney RN  Outcome: Progressing  2025 by Anahi Ayala RN  Outcome: Progressing  Goal: Absence of fever/infection during anticipated neutropenic period  2025 by Slime Delaney RN  Outcome: Progressing  2025 by Anahi Ayala RN  Outcome: Progressing     Problem: Safety - Adult  Goal: Free from fall injury  2025 by Slime Delaney RN  Outcome:

## 2025-02-13 NOTE — PROGRESS NOTES
Spiritual Health History and Assessment/Progress Note  [facility]    Spiritual/Emotional Needs,  ,  ,      Name: Morteza Montgomery MRN: [id number]    Age: 24 y.o.     Sex: female   Language: English   Holiness: None   39 weeks gestation of pregnancy     Date: 2/13/2025            Total Time Calculated: 28 min              Spiritual Assessment continued in [facility] 3E [facility]        Referral/Consult From: Patient   Encounter Overview/Reason: Spiritual/Emotional Needs  Service Provided For: Patient    Mil[place]ros, Belief, Meaning:   Patient identifies as spiritual, is connected with a milagros tradition or spiritual practice, and has beliefs or practices that help with coping during difficult times  Family/Friends No family/friends present      Importance and Influence:  Patient has spiritual/personal beliefs that influence decisions regarding their health  Family/Friends No family/friends present    Community:  Patient is connected with a spiritual community and feels well-supported. Support system includes: Spouse/Partner, Parent/s, and Mil[place] Community  Family/Friends No family/friends present    Assessment and Plan of Care:     Patient Interventions include: Facilitated expression of thoughts and feelings, Explored spiritual coping/struggle/distress, Engaged in theological reflection, Affirmed coping skills/support systems, and Facilitated life review and/ or legacy  Family/Friends Interventions include: No family/friends present    Patient Plan of Care: Spiritual Care available upon further referral  Family/Friends Plan of Care: Spiritual Care available upon further referral     provided follow up support with the patient, per patient request from yesterday. She shared about her procedure yesterday and processed her experience. She has appreciated  follow up and prayer.  provided a listening presence as she shared about her procedure and her new daughter. Active listening, spiritual  dialogue and prayer provided.     Electronically signed by VINNIE LAM on 2/13/2025 at 11:19 AM

## 2025-02-14 PROBLEM — Z14.8 CARRIER OF SPINAL MUSCULAR ATROPHY: Status: RESOLVED | Noted: 2024-08-28 | Resolved: 2025-02-14

## 2025-02-14 PROBLEM — O99.013 ANEMIA COMPLICATING PREGNANCY, THIRD TRIMESTER: Status: RESOLVED | Noted: 2025-02-12 | Resolved: 2025-02-14

## 2025-02-14 PROBLEM — Z3A.39 39 WEEKS GESTATION OF PREGNANCY: Status: RESOLVED | Noted: 2025-02-12 | Resolved: 2025-02-14

## 2025-02-14 PROBLEM — D62 ACUTE ON CHRONIC BLOOD LOSS ANEMIA: Status: ACTIVE | Noted: 2025-02-14

## 2025-02-14 PROBLEM — O09.30 POOR PATIENT ATTENDANCE OF ANTENATAL CARE: Status: RESOLVED | Noted: 2025-02-07 | Resolved: 2025-02-14

## 2025-02-14 LAB
HCT VFR BLD AUTO: 21.1 % (ref 35–47)
HCT VFR BLD AUTO: 28.6 % (ref 35–47)
HGB BLD-MCNC: 6.7 G/DL (ref 11.5–16)
HGB BLD-MCNC: 9.4 G/DL (ref 11.5–16)

## 2025-02-14 PROCEDURE — P9016 RBC LEUKOCYTES REDUCED: HCPCS

## 2025-02-14 PROCEDURE — 36415 COLL VENOUS BLD VENIPUNCTURE: CPT

## 2025-02-14 PROCEDURE — 85014 HEMATOCRIT: CPT

## 2025-02-14 PROCEDURE — 6370000000 HC RX 637 (ALT 250 FOR IP): Performed by: OBSTETRICS & GYNECOLOGY

## 2025-02-14 PROCEDURE — 36430 TRANSFUSION BLD/BLD COMPNT: CPT

## 2025-02-14 PROCEDURE — 30233N1 TRANSFUSION OF NONAUTOLOGOUS RED BLOOD CELLS INTO PERIPHERAL VEIN, PERCUTANEOUS APPROACH: ICD-10-PCS | Performed by: OBSTETRICS & GYNECOLOGY

## 2025-02-14 PROCEDURE — 85018 HEMOGLOBIN: CPT

## 2025-02-14 PROCEDURE — 1120000000 HC RM PRIVATE OB

## 2025-02-14 PROCEDURE — 6360000002 HC RX W HCPCS: Performed by: OBSTETRICS & GYNECOLOGY

## 2025-02-14 PROCEDURE — 2580000003 HC RX 258: Performed by: OBSTETRICS & GYNECOLOGY

## 2025-02-14 PROCEDURE — 99024 POSTOP FOLLOW-UP VISIT: CPT | Performed by: OBSTETRICS & GYNECOLOGY

## 2025-02-14 RX ORDER — SODIUM CHLORIDE 9 MG/ML
INJECTION, SOLUTION INTRAVENOUS PRN
Status: DISCONTINUED | OUTPATIENT
Start: 2025-02-14 | End: 2025-02-15 | Stop reason: HOSPADM

## 2025-02-14 RX ORDER — SODIUM CHLORIDE 9 MG/ML
INJECTION, SOLUTION INTRAVENOUS PRN
Status: COMPLETED | OUTPATIENT
Start: 2025-02-14 | End: 2025-02-14

## 2025-02-14 RX ADMIN — OXYCODONE HYDROCHLORIDE 10 MG: 10 TABLET ORAL at 05:26

## 2025-02-14 RX ADMIN — DOCUSATE SODIUM 100 MG: 100 CAPSULE, LIQUID FILLED ORAL at 08:16

## 2025-02-14 RX ADMIN — IBUPROFEN 800 MG: 800 TABLET, FILM COATED ORAL at 08:16

## 2025-02-14 RX ADMIN — ONDANSETRON 4 MG: 2 INJECTION INTRAMUSCULAR; INTRAVENOUS at 12:24

## 2025-02-14 RX ADMIN — OXYCODONE HYDROCHLORIDE 10 MG: 10 TABLET ORAL at 01:24

## 2025-02-14 RX ADMIN — SIMETHICONE 80 MG: 80 TABLET, CHEWABLE ORAL at 08:16

## 2025-02-14 RX ADMIN — SODIUM CHLORIDE: 9 INJECTION, SOLUTION INTRAVENOUS at 16:01

## 2025-02-14 RX ADMIN — OXYCODONE HYDROCHLORIDE 10 MG: 10 TABLET ORAL at 10:45

## 2025-02-14 RX ADMIN — OXYCODONE 5 MG: 5 TABLET ORAL at 21:20

## 2025-02-14 RX ADMIN — DOCUSATE SODIUM 100 MG: 100 CAPSULE, LIQUID FILLED ORAL at 21:20

## 2025-02-14 RX ADMIN — METRONIDAZOLE 500 MG: 500 TABLET ORAL at 08:30

## 2025-02-14 RX ADMIN — ACETAMINOPHEN 1000 MG: 500 TABLET, FILM COATED ORAL at 13:14

## 2025-02-14 RX ADMIN — ACETAMINOPHEN 1000 MG: 500 TABLET, FILM COATED ORAL at 21:24

## 2025-02-14 RX ADMIN — METRONIDAZOLE 500 MG: 500 TABLET ORAL at 21:20

## 2025-02-14 RX ADMIN — SIMETHICONE 80 MG: 80 TABLET, CHEWABLE ORAL at 16:57

## 2025-02-14 RX ADMIN — IBUPROFEN 800 MG: 800 TABLET, FILM COATED ORAL at 01:24

## 2025-02-14 RX ADMIN — IBUPROFEN 800 MG: 800 TABLET, FILM COATED ORAL at 16:57

## 2025-02-14 RX ADMIN — ACETAMINOPHEN 1000 MG: 500 TABLET, FILM COATED ORAL at 05:06

## 2025-02-14 RX ADMIN — SODIUM CHLORIDE: 9 INJECTION, SOLUTION INTRAVENOUS at 10:20

## 2025-02-14 ASSESSMENT — PAIN DESCRIPTION - ORIENTATION
ORIENTATION: LOWER

## 2025-02-14 ASSESSMENT — PAIN DESCRIPTION - DESCRIPTORS
DESCRIPTORS: ACHING;CRAMPING
DESCRIPTORS: CRAMPING
DESCRIPTORS: ACHING
DESCRIPTORS: ACHING

## 2025-02-14 ASSESSMENT — PAIN - FUNCTIONAL ASSESSMENT
PAIN_FUNCTIONAL_ASSESSMENT: ACTIVITIES ARE NOT PREVENTED

## 2025-02-14 ASSESSMENT — PAIN DESCRIPTION - LOCATION
LOCATION: BACK
LOCATION: ABDOMEN;INCISION
LOCATION: ABDOMEN
LOCATION: ABDOMEN;INCISION

## 2025-02-14 ASSESSMENT — PAIN SCALES - GENERAL
PAINLEVEL_OUTOF10: 6
PAINLEVEL_OUTOF10: 8

## 2025-02-14 NOTE — PROGRESS NOTES
0925: Tdap VIS given    1020: consent for blood administration obtained. Education reviewed    1052:  unit #1 PRBC started    1435: unit #2 PRBC stopped    1520: patient requesting to ambulate prior to 2nd unit PRBC    1620: unit #2 PRBC started

## 2025-02-14 NOTE — PLAN OF CARE
Problem: Pain  Goal: Verbalizes/displays adequate comfort level or baseline comfort level  Outcome: Progressing  Flowsheets (Taken 2025)  Verbalizes/displays adequate comfort level or baseline comfort level:   Encourage patient to monitor pain and request assistance   Assess pain using appropriate pain scale     Problem: ABCDS Injury Assessment  Goal: Absence of physical injury  Outcome: Progressing     Problem: Postpartum  Goal: Experiences normal postpartum course  Description:  Postpartum OB-Pregnancy care plan goal which identifies if the mother is experiencing a normal postpartum course  Outcome: Progressing  Goal: Appropriate maternal -  bonding  Description:  Postpartum OB-Pregnancy care plan goal which identifies if the mother and  are bonding appropriately  Outcome: Progressing  Goal: Establishment of infant feeding pattern  Description:  Postpartum OB-Pregnancy care plan goal which identifies if the mother is establishing a feeding pattern with their   Outcome: Progressing  Goal: Incisions, wounds, or drain sites healing without S/S of infection  Outcome: Progressing     Problem: Infection - Adult  Goal: Absence of infection at discharge  Outcome: Progressing  Goal: Absence of infection during hospitalization  Outcome: Progressing  Goal: Absence of fever/infection during anticipated neutropenic period  Outcome: Progressing     Problem: Safety - Adult  Goal: Free from fall injury  Outcome: Progressing     Problem: Discharge Planning  Goal: Discharge to home or other facility with appropriate resources  Outcome: Progressing     Problem: Chronic Conditions and Co-morbidities  Goal: Patient's chronic conditions and co-morbidity symptoms are monitored and maintained or improved  Outcome: Progressing

## 2025-02-14 NOTE — PROGRESS NOTES
OB PROGRESS NOTE    PROBLEM:  Patient Active Problem List   Diagnosis    History of  section    Trichomonal vaginitis during pregnancy in third trimester    Delivery by elective  section    Acute on chronic blood loss anemia   Postoperative day #2    SUBJECTIVE: Patient is doing well, has no complaints.    VITALS:  Vitals:    25 1107   BP: (!) 150/92   Pulse: 83   Resp: 16   Temp: 98.5 °F (36.9 °C)   SpO2: 100%      HEART: Regular rhythm, no murmur  LUNGS: Clear to auscultation at both fields  ABDOMEN: Soft and depressible, normal bowel sounds  PELVIC: Normal lochia      LABS:  Results for orders placed or performed during the hospital encounter of 25   Culture, Urine    Specimen: Urine   Result Value Ref Range    Special Requests No Special Requests  Reflexed from [id number]        [place] count 30,000      [place] count colonies/ml      Culture Mixed urogenital adithya isolated     CBC   Result Value Ref Range    WBC 9.9 3.6 - 11.0 K/uL    RBC 3.18 (L) 3.80 - 5.20 M/uL    Hemoglobin 8.5 (L) 11.5 - 16.0 g/dL    Hematocrit 27.0 (L) 35.0 - 47.0 %    MCV 84.9 80.0 - 99.0 FL    MCH 26.7 26.0 - 34.0 PG    MCHC 31.5 30.0 - 36.5 g/dL    RDW 13.0 11.5 - 14.5 %    Platelets 196 150 - 400 K/uL    MPV 10.8 8.9 - 12.9 FL    Nucleated RBCs 0.0 0.0  WBC    nRBC 0.00 0.00 - 0.01 K/uL   DRUG SCREEN MULTI URINE   Result Value Ref Range    Amphetamine, Urine Negative Negative      Barbiturates, Urine Negative Negative      Benzodiazepines, Urine Negative Negative      Cocaine, Urine Negative Negative      Methadone, Urine Negative Negative      Opiates, Urine Negative Negative      Phencyclidine, Urine Negative Negative      THC, TH-Cannabinol, Urine Positive (A) Negative      Comments:        This test is a screen for drugs of abuse in a medical setting only (i.e., they are unconfirmed results and as such must not be used for non-medical purposes, e.g.,employment testing, legal testing). Due to its

## 2025-02-14 NOTE — CONSENT
Informed Consent for Blood Component Transfusion Note    I have discussed with the patient the rationale for blood component transfusion; its benefits in treating or preventing fatigue, organ damage, or death; and its risk which includes mild transfusion reactions, rare risk of blood borne infection, or more serious but rare reactions. I have discussed the alternatives to transfusion, including the risk and consequences of not receiving transfusion. The patient had an opportunity to ask questions and had agreed to proceed with transfusion of blood components.    Electronically signed by Hema Diego MD on 2/14/25 at 9:29 AM EST

## 2025-02-14 NOTE — PLAN OF CARE
Problem: Pain  Goal: Verbalizes/displays adequate comfort level or baseline comfort level  Outcome: Progressing  Flowsheets (Taken 2025 0810)  Verbalizes/displays adequate comfort level or baseline comfort level:   Encourage patient to monitor pain and request assistance   Assess pain using appropriate pain scale   Administer analgesics based on type and severity of pain and evaluate response   Implement non-pharmacological measures as appropriate and evaluate response     Problem: ABCDS Injury Assessment  Goal: Absence of physical injury  Outcome: Progressing  Flowsheets (Taken 2025)  Absence of Physical Injury: Implement safety measures based on patient assessment     Problem: Postpartum  Goal: Experiences normal postpartum course  Description:  Postpartum OB-Pregnancy care plan goal which identifies if the mother is experiencing a normal postpartum course  Outcome: Progressing  Flowsheets (Taken 2025)  Experiences Normal Postpartum Course: Monitor maternal vital signs  Goal: Appropriate maternal -  bonding  Description:  Postpartum OB-Pregnancy care plan goal which identifies if the mother and  are bonding appropriately  Outcome: Progressing  Goal: Establishment of infant feeding pattern  Description:  Postpartum OB-Pregnancy care plan goal which identifies if the mother is establishing a feeding pattern with their   Outcome: Progressing  Flowsheets (Taken 2025)  Establishment of Infant Feeding Pattern: Assess breast/bottle feeding  Goal: Incisions, wounds, or drain sites healing without S/S of infection  Outcome: Progressing  Flowsheets (Taken 2025 by Slime Delaney RN)  Incisions, Wounds, or Drain Sites Healing Without Sign and Symptoms of Infection: TWICE DAILY: Assess and document skin integrity     Problem: Infection - Adult  Goal: Absence of infection at discharge  Outcome: Progressing  Flowsheets (Taken 2025)  Absence of

## 2025-02-15 VITALS
RESPIRATION RATE: 16 BRPM | OXYGEN SATURATION: 100 % | DIASTOLIC BLOOD PRESSURE: 80 MMHG | HEIGHT: 68 IN | HEART RATE: 55 BPM | SYSTOLIC BLOOD PRESSURE: 132 MMHG | TEMPERATURE: 98.1 F | BODY MASS INDEX: 35.21 KG/M2 | WEIGHT: 232.31 LBS

## 2025-02-15 LAB
ABO + RH BLD: NORMAL
BLD PROD TYP BPU: NORMAL
BLD PROD TYP BPU: NORMAL
BLOOD BANK BLOOD PRODUCT EXPIRATION DATE: NORMAL
BLOOD BANK BLOOD PRODUCT EXPIRATION DATE: NORMAL
BLOOD BANK DISPENSE STATUS: NORMAL
BLOOD BANK DISPENSE STATUS: NORMAL
BLOOD BANK ISBT PRODUCT BLOOD TYPE: 5100
BLOOD BANK ISBT PRODUCT BLOOD TYPE: 5100
BLOOD BANK PRODUCT CODE: NORMAL
BLOOD BANK PRODUCT CODE: NORMAL
BLOOD BANK UNIT TYPE AND RH: NORMAL
BLOOD BANK UNIT TYPE AND RH: NORMAL
BLOOD GROUP ANTIBODIES SERPL: NEGATIVE
BPU ID: NORMAL
BPU ID: NORMAL
CROSSMATCH RESULT: NORMAL
CROSSMATCH RESULT: NORMAL
SPECIMEN EXP DATE BLD: NORMAL
TRANSFUSION STATUS PATIENT QL: NORMAL
TRANSFUSION STATUS PATIENT QL: NORMAL
UNIT DIVISION: 0
UNIT DIVISION: 0
UNIT ISSUE DATE/TIME: NORMAL
UNIT ISSUE DATE/TIME: NORMAL

## 2025-02-15 PROCEDURE — 99024 POSTOP FOLLOW-UP VISIT: CPT | Performed by: OBSTETRICS & GYNECOLOGY

## 2025-02-15 PROCEDURE — 6370000000 HC RX 637 (ALT 250 FOR IP): Performed by: OBSTETRICS & GYNECOLOGY

## 2025-02-15 RX ADMIN — ACETAMINOPHEN 1000 MG: 500 TABLET, FILM COATED ORAL at 13:52

## 2025-02-15 RX ADMIN — OXYCODONE HYDROCHLORIDE 10 MG: 10 TABLET ORAL at 01:21

## 2025-02-15 RX ADMIN — OXYCODONE HYDROCHLORIDE 10 MG: 10 TABLET ORAL at 12:16

## 2025-02-15 RX ADMIN — DOCUSATE SODIUM 100 MG: 100 CAPSULE, LIQUID FILLED ORAL at 09:32

## 2025-02-15 RX ADMIN — IBUPROFEN 800 MG: 800 TABLET, FILM COATED ORAL at 01:21

## 2025-02-15 RX ADMIN — OXYCODONE HYDROCHLORIDE 10 MG: 10 TABLET ORAL at 06:32

## 2025-02-15 RX ADMIN — IBUPROFEN 800 MG: 800 TABLET, FILM COATED ORAL at 09:32

## 2025-02-15 RX ADMIN — ACETAMINOPHEN 1000 MG: 500 TABLET, FILM COATED ORAL at 06:30

## 2025-02-15 RX ADMIN — METRONIDAZOLE 500 MG: 500 TABLET ORAL at 09:32

## 2025-02-15 ASSESSMENT — PAIN DESCRIPTION - ORIENTATION
ORIENTATION: LOWER

## 2025-02-15 ASSESSMENT — PAIN - FUNCTIONAL ASSESSMENT
PAIN_FUNCTIONAL_ASSESSMENT: ACTIVITIES ARE NOT PREVENTED
PAIN_FUNCTIONAL_ASSESSMENT: ACTIVITIES ARE NOT PREVENTED

## 2025-02-15 ASSESSMENT — PAIN DESCRIPTION - DESCRIPTORS
DESCRIPTORS: ACHING

## 2025-02-15 ASSESSMENT — PAIN SCALES - GENERAL
PAINLEVEL_OUTOF10: 10
PAINLEVEL_OUTOF10: 10
PAINLEVEL_OUTOF10: 8

## 2025-02-15 ASSESSMENT — PAIN DESCRIPTION - LOCATION
LOCATION: BACK

## 2025-02-15 NOTE — DISCHARGE SUMMARY
Patient ID:[id number]     DISCHARGE SUMMARY    ADMITTING DIAGNOSIS:  Intrauterine pregnancy at 39-1/7-week gestation  History of  section  Carrier of spinal muscular atrophy  Poor patient attendance of  care  Anemia complicating pregnancy, third trimester  Trichomonal vaginitis during pregnancy in third trimester, active    DISCHARGE DIAGNOSIS:  Intrauterine pregnancy at 39-1/7-week gestation  History of  section  Delivery  via  section  Carrier of spinal muscular atrophy  Poor patient attendance of  care  Anemia complicating pregnancy, third trimester  Trichomonal vaginitis during pregnancy in third trimester, active    PROCEDURES PERFORMED:   Low transverse  section  Blood transfusion    HISTORY OF PRESENT ILLNESS: 24 years old patient admitted with IUP at term for an elective, repeat  section who underwent the procedure without complications.  Postpartum complicated by acute on chronic blood loss anemia requiring blood transfusion.  Patient is actually hemodynamically stable, afebrile, without further complications.    OBJECTIVE:  VITALS:  Vitals:    02/15/25 0737   BP: 132/80   Pulse: 55   Resp: 16   Temp: 98.1 °F (36.7 °C)   SpO2: 100%      HEART: Regular rhythm, no murmur  LUNGS: Clear to auscultation at both fields  ABDOMEN: Soft and depressible, normal bowel sounds  PELVIC: Normal findings    Results for orders placed or performed during the hospital encounter of 25   Culture, Urine    Specimen: Urine   Result Value Ref Range    Special Requests No Special Requests  Reflexed from [id number]        Brethren count 30,000      Brethren count colonies/ml      Culture Mixed urogenital adithya isolated     CBC   Result Value Ref Range    WBC 9.9 3.6 - 11.0 K/uL    RBC 3.18 (L) 3.80 - 5.20 M/uL    Hemoglobin 8.5 (L) 11.5 - 16.0 g/dL    Hematocrit 27.0 (L) 35.0 - 47.0 %    MCV 84.9 80.0 - 99.0 FL    MCH 26.7 26.0 - 34.0 PG    MCHC 31.5 30.0 - 36.5 g/dL

## 2025-02-15 NOTE — PROGRESS NOTES
1210: Discharge instructions reviewed. Aware post-birth warning signs listed in postpartum and  care guide and when to call 911.  Encouraged to review postpartum and  care guide given at admission. Patient given list of postpartum resources. Rx for PERCOCET, colace, motrin and flagyl sent to pharmacy on file. Patient aware purpose and side effects of meds. Patient aware when and how to call md after discharge. Patient aware follow up date, time and location.  Aware signs and symptoms of depression to call md regarding after discharge. No questions. States understanding.      1410: Patient discharged via wheelchair to front lobby. Baby in carseat. Belongings with patient

## 2025-02-21 ENCOUNTER — POSTPARTUM VISIT (OUTPATIENT)
Age: 25
End: 2025-02-21

## 2025-02-21 VITALS
HEIGHT: 68 IN | WEIGHT: 220.25 LBS | HEART RATE: 67 BPM | DIASTOLIC BLOOD PRESSURE: 90 MMHG | TEMPERATURE: 97.7 F | RESPIRATION RATE: 16 BRPM | SYSTOLIC BLOOD PRESSURE: 139 MMHG | OXYGEN SATURATION: 99 % | BODY MASS INDEX: 33.38 KG/M2

## 2025-02-21 PROCEDURE — 99024 POSTOP FOLLOW-UP VISIT: CPT | Performed by: OBSTETRICS & GYNECOLOGY

## 2025-02-21 NOTE — PROGRESS NOTES
Morteza is a 24 y.o. female who presents today for the following:    Chief Complaint   Patient presents with    Postpartum Care     1 week post         No Known Allergies       Current Outpatient Medications:     ibuprofen (ADVIL;MOTRIN) 800 MG tablet, Take 1 tablet by mouth every 8 hours as needed for Pain, Disp: 30 tablet, Rfl: 0    docusate sodium (COLACE) 100 MG capsule, Take 1 capsule by mouth 2 times daily, Disp: 60 capsule, Rfl: 0    ferrous sulfate (IRON 325) 325 (65 Fe) MG tablet, Take 1 tablet by mouth 2 times daily, Disp: 120 tablet, Rfl: 5     Past Medical History:   Diagnosis Date    Hypertension     h/o preeclampsia with 1st pregnancy    Right wrist injury         Past Surgical History:   Procedure Laterality Date     SECTION      MOUTH SURGERY Bilateral     adjusted right upper and left lower tooth and fixed chain to braces (no longer has)        No family history on file.     Social History     Socioeconomic History    Marital status: Single   Tobacco Use    Smoking status: Never    Smokeless tobacco: Never   Vaping Use    Vaping status: Never Used   Substance and Sexual Activity    Alcohol use: Never    Drug use: Not Currently     Types: Marijuana (Weed)    Sexual activity: Yes     Partners: Male     Social Determinants of Health     Food Insecurity: No Food Insecurity (2025)    Hunger Vital Sign     Worried About Running Out of Food in the Last Year: Never true     Ran Out of Food in the Last Year: Never true   Transportation Needs: No Transportation Needs (2025)    PRAPARE - Transportation     Lack of Transportation (Medical): No     Lack of Transportation (Non-Medical): No   Housing Stability: Low Risk  (2025)    Housing Stability Vital Sign     Unable to Pay for Housing in the Last Year: No     Number of Times Moved in the Last Year: 1     Homeless in the Last Year: No        HPI   Here today for follow-up.  Status post  section.  Patient is not

## 2025-06-17 ENCOUNTER — TELEPHONE (OUTPATIENT)
Age: 25
End: 2025-06-17

## 2025-06-17 NOTE — TELEPHONE ENCOUNTER
06/17/2025-Representative called from [facility] asked for ins information for dos 02/07/2025. I gave straight Medicaid information for this date of service.

## (undated) DEVICE — Z DISCONTINUED NO SUB SUTURE PLN GUT SZ 2-0 L27IN ABSRB YELLOWISH TAN L70MM XLH 53T

## (undated) DEVICE — ELECTRODE PT RET AD L9FT HI MOIST COND ADH HYDRGEL CORDED

## (undated) DEVICE — SUTURE VICRYL ABSRB BRAID COAT UD CT NO 1 36IN  J959H

## (undated) DEVICE — BAG,SPONGE COUNTER,CLEAR,50/BX,5BX/CS: Brand: MEDLINE

## (undated) DEVICE — SUTURE VICRYL + SZ 2-0 L36IN ABSRB UD L36MM CT-1 1/2 CIR VCP945H

## (undated) DEVICE — STERILE POLYISOPRENE POWDER-FREE SURGICAL GLOVES WITH EMOLLIENT COATING: Brand: PROTEXIS

## (undated) DEVICE — SUTURE VICRYL SZ 0 L36IN ABSRB UD L40MM CT 1/2 CIR TAPERPOINT J958H

## (undated) DEVICE — STAPLER SKIN SQ 30 ABSRB STPL DISP INSORB ORDER VIA PHONE OR EMAIL

## (undated) DEVICE — SUTURE CHROMIC GUT SZ 1 L36IN ABSRB BRN L48MM CTX 1/2 CIR 905H

## (undated) DEVICE — APPLICATOR MEDICATED 26 CC SOLUTION SCRB TEAL CHLORAPREP

## (undated) DEVICE — STERILE C SECT DRP W/ WIND

## (undated) DEVICE — ADHESIVE SKIN CLOSURE 0.7CC TOP MICROBIAL APPL DERMBND ADV

## (undated) DEVICE — TRAY URIN CATH PED 16FR BLLN 5CC INDWL STR TIP INF CTRL

## (undated) DEVICE — SOLUTION IRRIG 1000ML 0.9% SOD CHL USP POUR PLAS BTL

## (undated) DEVICE — C-SECTION: Brand: MEDLINE INDUSTRIES, INC.

## (undated) DEVICE — HANDLE SURG LIGHT OB